# Patient Record
Sex: FEMALE | Race: BLACK OR AFRICAN AMERICAN | Employment: FULL TIME | URBAN - METROPOLITAN AREA
[De-identification: names, ages, dates, MRNs, and addresses within clinical notes are randomized per-mention and may not be internally consistent; named-entity substitution may affect disease eponyms.]

---

## 2018-10-18 ENCOUNTER — HOSPITAL ENCOUNTER (EMERGENCY)
Age: 33
Discharge: HOME | End: 2018-10-18
Attending: EMERGENCY MEDICINE
Payer: COMMERCIAL

## 2018-10-18 ENCOUNTER — APPOINTMENT (OUTPATIENT)
Dept: CT IMAGING | Age: 33
End: 2018-10-18
Payer: COMMERCIAL

## 2018-10-18 ENCOUNTER — APPOINTMENT (OUTPATIENT)
Dept: ULTRASOUND IMAGING | Age: 33
End: 2018-10-18
Payer: COMMERCIAL

## 2018-10-18 VITALS
DIASTOLIC BLOOD PRESSURE: 57 MMHG | RESPIRATION RATE: 15 BRPM | SYSTOLIC BLOOD PRESSURE: 111 MMHG | HEART RATE: 70 BPM | OXYGEN SATURATION: 99 % | WEIGHT: 130 LBS | BODY MASS INDEX: 22.31 KG/M2 | TEMPERATURE: 97.8 F

## 2018-10-18 DIAGNOSIS — N39.0 URINARY TRACT INFECTION WITHOUT HEMATURIA, SITE UNSPECIFIED: ICD-10-CM

## 2018-10-18 DIAGNOSIS — N83.201 RIGHT OVARIAN CYST: Primary | ICD-10-CM

## 2018-10-18 LAB
A/G RATIO: 1 (ref 1.1–2.2)
ALBUMIN SERPL-MCNC: 4.5 G/DL (ref 3.4–5)
ALP BLD-CCNC: 56 U/L (ref 40–129)
ALT SERPL-CCNC: 6 U/L (ref 10–40)
ANION GAP SERPL CALCULATED.3IONS-SCNC: 13 MMOL/L (ref 3–16)
AST SERPL-CCNC: 20 U/L (ref 15–37)
BACTERIA WET PREP: NORMAL
BACTERIA: ABNORMAL /HPF
BASOPHILS ABSOLUTE: 0 K/UL (ref 0–0.2)
BASOPHILS RELATIVE PERCENT: 0.2 %
BILIRUB SERPL-MCNC: 0.3 MG/DL (ref 0–1)
BILIRUBIN URINE: NEGATIVE
BLOOD, URINE: NEGATIVE
BUN BLDV-MCNC: 8 MG/DL (ref 7–20)
CALCIUM SERPL-MCNC: 9.1 MG/DL (ref 8.3–10.6)
CHLORIDE BLD-SCNC: 101 MMOL/L (ref 99–110)
CLARITY: ABNORMAL
CLUE CELLS: NORMAL
CO2: 22 MMOL/L (ref 21–32)
COLOR: YELLOW
CREAT SERPL-MCNC: 0.5 MG/DL (ref 0.6–1.1)
EOSINOPHILS ABSOLUTE: 0 K/UL (ref 0–0.6)
EOSINOPHILS RELATIVE PERCENT: 0.2 %
EPITHELIAL CELLS WET PREP: NORMAL
EPITHELIAL CELLS, UA: 5 /HPF (ref 0–5)
GFR AFRICAN AMERICAN: >60
GFR NON-AFRICAN AMERICAN: >60
GLOBULIN: 4.3 G/DL
GLUCOSE BLD-MCNC: 104 MG/DL (ref 70–99)
GLUCOSE URINE: NEGATIVE MG/DL
HCG QUALITATIVE: NEGATIVE
HCG(URINE) PREGNANCY TEST: NEGATIVE
HCT VFR BLD CALC: 43.8 % (ref 36–48)
HEMOGLOBIN: 14.4 G/DL (ref 12–16)
HYALINE CASTS: 5 /LPF (ref 0–8)
KETONES, URINE: NEGATIVE MG/DL
LACTIC ACID: 0.7 MMOL/L (ref 0.4–2)
LEUKOCYTE ESTERASE, URINE: ABNORMAL
LYMPHOCYTES ABSOLUTE: 1.4 K/UL (ref 1–5.1)
LYMPHOCYTES RELATIVE PERCENT: 7.1 %
MAGNESIUM: 2.2 MG/DL (ref 1.8–2.4)
MCH RBC QN AUTO: 30.1 PG (ref 26–34)
MCHC RBC AUTO-ENTMCNC: 32.9 G/DL (ref 31–36)
MCV RBC AUTO: 91.2 FL (ref 80–100)
MICROSCOPIC EXAMINATION: YES
MONOCYTES ABSOLUTE: 0.5 K/UL (ref 0–1.3)
MONOCYTES RELATIVE PERCENT: 2.5 %
NEUTROPHILS ABSOLUTE: 17.3 K/UL (ref 1.7–7.7)
NEUTROPHILS RELATIVE PERCENT: 90 %
NITRITE, URINE: NEGATIVE
PDW BLD-RTO: 12.5 % (ref 12.4–15.4)
PH UA: 5
PLATELET # BLD: 271 K/UL (ref 135–450)
PMV BLD AUTO: 8.3 FL (ref 5–10.5)
POTASSIUM REFLEX MAGNESIUM: 3.5 MMOL/L (ref 3.5–5.1)
PROTEIN UA: NEGATIVE MG/DL
RBC # BLD: 4.8 M/UL (ref 4–5.2)
RBC UA: 1 /HPF (ref 0–4)
RBC WET PREP: NORMAL
SODIUM BLD-SCNC: 136 MMOL/L (ref 136–145)
SOURCE WET PREP: NORMAL
SPECIFIC GRAVITY UA: 1.02
TOTAL PROTEIN: 8.8 G/DL (ref 6.4–8.2)
TRICHOMONAS PREP: NORMAL
URINE TYPE: ABNORMAL
UROBILINOGEN, URINE: 0.2 E.U./DL
WBC # BLD: 19.2 K/UL (ref 4–11)
WBC UA: 9 /HPF (ref 0–5)
WBC WET PREP: NORMAL
YEAST WET PREP: NORMAL

## 2018-10-18 PROCEDURE — 87210 SMEAR WET MOUNT SALINE/INK: CPT

## 2018-10-18 PROCEDURE — 76830 TRANSVAGINAL US NON-OB: CPT

## 2018-10-18 PROCEDURE — 2580000003 HC RX 258: Performed by: PHYSICIAN ASSISTANT

## 2018-10-18 PROCEDURE — 87491 CHLMYD TRACH DNA AMP PROBE: CPT

## 2018-10-18 PROCEDURE — 87591 N.GONORRHOEAE DNA AMP PROB: CPT

## 2018-10-18 PROCEDURE — 83605 ASSAY OF LACTIC ACID: CPT

## 2018-10-18 PROCEDURE — 80053 COMPREHEN METABOLIC PANEL: CPT

## 2018-10-18 PROCEDURE — 6360000002 HC RX W HCPCS: Performed by: PHYSICIAN ASSISTANT

## 2018-10-18 PROCEDURE — 99284 EMERGENCY DEPT VISIT MOD MDM: CPT

## 2018-10-18 PROCEDURE — 6360000004 HC RX CONTRAST MEDICATION: Performed by: PHYSICIAN ASSISTANT

## 2018-10-18 PROCEDURE — 6370000000 HC RX 637 (ALT 250 FOR IP): Performed by: PHYSICIAN ASSISTANT

## 2018-10-18 PROCEDURE — 84703 CHORIONIC GONADOTROPIN ASSAY: CPT

## 2018-10-18 PROCEDURE — 96375 TX/PRO/DX INJ NEW DRUG ADDON: CPT

## 2018-10-18 PROCEDURE — 85025 COMPLETE CBC W/AUTO DIFF WBC: CPT

## 2018-10-18 PROCEDURE — 74177 CT ABD & PELVIS W/CONTRAST: CPT

## 2018-10-18 PROCEDURE — 81001 URINALYSIS AUTO W/SCOPE: CPT

## 2018-10-18 PROCEDURE — 96374 THER/PROPH/DIAG INJ IV PUSH: CPT

## 2018-10-18 PROCEDURE — 96361 HYDRATE IV INFUSION ADD-ON: CPT

## 2018-10-18 PROCEDURE — 6370000000 HC RX 637 (ALT 250 FOR IP): Performed by: EMERGENCY MEDICINE

## 2018-10-18 PROCEDURE — 83735 ASSAY OF MAGNESIUM: CPT

## 2018-10-18 RX ORDER — HYDROCODONE BITARTRATE AND ACETAMINOPHEN 5; 325 MG/1; MG/1
1 TABLET ORAL ONCE
Status: COMPLETED | OUTPATIENT
Start: 2018-10-18 | End: 2018-10-18

## 2018-10-18 RX ORDER — SULFAMETHOXAZOLE AND TRIMETHOPRIM 800; 160 MG/1; MG/1
1 TABLET ORAL 2 TIMES DAILY
Qty: 6 TABLET | Refills: 0 | Status: SHIPPED | OUTPATIENT
Start: 2018-10-18 | End: 2018-10-21

## 2018-10-18 RX ORDER — KETOROLAC TROMETHAMINE 30 MG/ML
15 INJECTION, SOLUTION INTRAMUSCULAR; INTRAVENOUS ONCE
Status: COMPLETED | OUTPATIENT
Start: 2018-10-18 | End: 2018-10-18

## 2018-10-18 RX ORDER — 0.9 % SODIUM CHLORIDE 0.9 %
1000 INTRAVENOUS SOLUTION INTRAVENOUS ONCE
Status: COMPLETED | OUTPATIENT
Start: 2018-10-18 | End: 2018-10-18

## 2018-10-18 RX ORDER — SULFAMETHOXAZOLE AND TRIMETHOPRIM 800; 160 MG/1; MG/1
1 TABLET ORAL ONCE
Status: COMPLETED | OUTPATIENT
Start: 2018-10-18 | End: 2018-10-18

## 2018-10-18 RX ORDER — ONDANSETRON 2 MG/ML
4 INJECTION INTRAMUSCULAR; INTRAVENOUS ONCE
Status: COMPLETED | OUTPATIENT
Start: 2018-10-18 | End: 2018-10-18

## 2018-10-18 RX ORDER — HYDROCODONE BITARTRATE AND ACETAMINOPHEN 5; 325 MG/1; MG/1
1 TABLET ORAL EVERY 6 HOURS PRN
Qty: 10 TABLET | Refills: 0 | Status: SHIPPED | OUTPATIENT
Start: 2018-10-18 | End: 2018-10-21

## 2018-10-18 RX ORDER — NAPROXEN 500 MG/1
500 TABLET ORAL 2 TIMES DAILY
Qty: 20 TABLET | Refills: 0 | Status: SHIPPED | OUTPATIENT
Start: 2018-10-18 | End: 2021-08-12

## 2018-10-18 RX ORDER — ONDANSETRON 4 MG/1
4-8 TABLET, ORALLY DISINTEGRATING ORAL EVERY 12 HOURS PRN
Qty: 12 TABLET | Refills: 0 | Status: SHIPPED | OUTPATIENT
Start: 2018-10-18 | End: 2021-08-12

## 2018-10-18 RX ORDER — MORPHINE SULFATE 4 MG/ML
4 INJECTION, SOLUTION INTRAMUSCULAR; INTRAVENOUS ONCE
Status: COMPLETED | OUTPATIENT
Start: 2018-10-18 | End: 2018-10-18

## 2018-10-18 RX ADMIN — ONDANSETRON 4 MG: 2 INJECTION INTRAMUSCULAR; INTRAVENOUS at 10:15

## 2018-10-18 RX ADMIN — IOPAMIDOL 75 ML: 755 INJECTION, SOLUTION INTRAVENOUS at 11:12

## 2018-10-18 RX ADMIN — KETOROLAC TROMETHAMINE 15 MG: 30 INJECTION, SOLUTION INTRAMUSCULAR at 11:49

## 2018-10-18 RX ADMIN — HYDROCODONE BITARTRATE AND ACETAMINOPHEN 1 TABLET: 5; 325 TABLET ORAL at 15:18

## 2018-10-18 RX ADMIN — SULFAMETHOXAZOLE AND TRIMETHOPRIM 1 TABLET: 800; 160 TABLET ORAL at 13:45

## 2018-10-18 RX ADMIN — SODIUM CHLORIDE 1000 ML: 9 INJECTION, SOLUTION INTRAVENOUS at 11:06

## 2018-10-18 RX ADMIN — MORPHINE SULFATE 4 MG: 4 INJECTION INTRAVENOUS at 10:16

## 2018-10-18 ASSESSMENT — ENCOUNTER SYMPTOMS
CONSTIPATION: 0
COUGH: 0
VOMITING: 0
SHORTNESS OF BREATH: 0
ABDOMINAL PAIN: 1
BACK PAIN: 0
NAUSEA: 0
COLOR CHANGE: 0
DIARRHEA: 0

## 2018-10-18 ASSESSMENT — PAIN SCALES - GENERAL
PAINLEVEL_OUTOF10: 10

## 2018-10-18 ASSESSMENT — PAIN DESCRIPTION - LOCATION: LOCATION: ABDOMEN

## 2018-10-18 ASSESSMENT — PAIN DESCRIPTION - PAIN TYPE: TYPE: ACUTE PAIN

## 2018-10-18 NOTE — LETTER
[facility] Emergency Department  [address], [facility], [address]             October 21, 2018    Patient: Poornima Romero   YOB: 1985   Date of Visit: 10/18/2018       To Whom It May Concern:    oPornima Romero was seen and treated in our emergency department on 10/18/2018. She can return to work on 10/22/18.        Sincerely,         ***

## 2018-10-18 NOTE — LETTER
[facility] Emergency Department  [address] [place] [address], [address]             October 18, 2018    Patient: Yash Mittal   YOB: 1985   Date of Visit: 10/18/2018       To Whom It May Concern:    Yash Mittal was seen and treated in our emergency department on 10/18/2018. She may return to work on 10/20/18.       Sincerely,         Tonya Steinberg RN

## 2018-10-22 LAB
C. TRACHOMATIS DNA ,URINE: NEGATIVE
N. GONORRHOEAE DNA, URINE: NEGATIVE

## 2020-05-19 ENCOUNTER — HOSPITAL ENCOUNTER (EMERGENCY)
Age: 35
Discharge: HOME | End: 2020-05-19
Attending: EMERGENCY MEDICINE
Payer: COMMERCIAL

## 2020-05-19 VITALS
TEMPERATURE: 97.9 F | DIASTOLIC BLOOD PRESSURE: 65 MMHG | SYSTOLIC BLOOD PRESSURE: 115 MMHG | RESPIRATION RATE: 22 BRPM | HEART RATE: 76 BPM | OXYGEN SATURATION: 100 %

## 2020-05-19 PROCEDURE — 99284 EMERGENCY DEPT VISIT MOD MDM: CPT

## 2020-05-19 ASSESSMENT — ENCOUNTER SYMPTOMS
ABDOMINAL PAIN: 0
WHEEZING: 0
VOMITING: 0
CONSTIPATION: 0
BACK PAIN: 0
EYE PAIN: 0
FACIAL SWELLING: 0
DIARRHEA: 0
COUGH: 0
NAUSEA: 0
SORE THROAT: 0
SHORTNESS OF BREATH: 0

## 2020-05-19 NOTE — ED TRIAGE NOTES
Pt presents to ED with c/o bilateral ankle swelling. Pt states she noticed the swelling before bed tonight and is concerned for blood clot.

## 2020-05-19 NOTE — ED PROVIDER NOTES
Date ofevaluation: 2020    Chief Complaint   Joint Swelling      Nursing Notes, Past Medical Hx, Past Surgical Hx, Social Hx, Allergies, and Family Hx were reviewed. History of Present Illness     Day Pavon is a 29 y.o. female who presents with joint swelling. Patient reports that beginning today she noticed that her bilateral ankles were swollen. She denies any pain with this. Was not on her feet significantly today. Has never had a 7 before. The swelling did not extend past her ankles. She denies any fevers or chills. She does report that she has been eating more salty foods. She was mostly concerned that she had a blood clot after reading on the Internet. Review of Systems     Review of Systems   Constitutional: Negative for activity change, appetite change, chills and fever. HENT: Negative for congestion, facial swelling and sore throat. Eyes: Negative for pain. Respiratory: Negative for cough, shortness of breath and wheezing. Cardiovascular: Negative for chest pain. Gastrointestinal: Negative for abdominal pain, constipation, diarrhea, nausea and vomiting. Genitourinary: Negative for dysuria. Musculoskeletal: Positive for joint swelling. Negative for back pain and neck pain. Skin: Negative for rash. Neurological: Negative for dizziness and weakness. Past Medical, Surgical, Family, and Social History         Diagnosis Date    Seasonal allergies          Procedure Laterality Date    DILATION AND CURETTAGE OF UTERUS      INDUCED        Her family history is not on file. She reports that she has never smoked. She does not have any smokeless tobacco history on file. She reports current alcohol use. She reports that she does not use drugs.     Medications     Previous Medications    BUTALBITAL-APAP-CAFFEINE (FIORICET) -40 MG CAPS PER CAPSULE    Take 1 capsule by mouth every 4 hours as needed for Headaches    FAMOTIDINE (PEPCID) 20 MG TABLET Take 1 tablet by mouth daily for 10 doses    LORATADINE (CLARITIN) 10 MG TABLET    Take 10 mg by mouth daily. NAPROXEN (NAPROSYN) 500 MG TABLET    Take 1 tablet by mouth 2 times daily for 20 doses    ONDANSETRON (ZOFRAN ODT) 4 MG DISINTEGRATING TABLET    Take 1-2 tablets by mouth every 12 hours as needed for Nausea May Sub regular tablet (non-ODT) if insurance does not cover ODT. Allergies     She is allergic to other. Physical Exam     INITIAL VITALS: Pulse 76   Temp 97.9 °F (36.6 °C) (Oral)   Resp 22   LMP 04/28/2020 (Approximate)   SpO2 100%    Physical Exam  Vitals signs and nursing note reviewed. Constitutional:       General: She is not in acute distress. Appearance: She is well-developed. She is not diaphoretic. HENT:      Head: Normocephalic and atraumatic. Eyes:      Pupils: Pupils are equal, round, and reactive to light. Neck:      Musculoskeletal: Normal range of motion and neck supple. Cardiovascular:      Rate and Rhythm: Normal rate and regular rhythm. Pulses: Normal pulses. Heart sounds: No murmur. Pulmonary:      Effort: Pulmonary effort is normal. No respiratory distress. Breath sounds: No wheezing. Abdominal:      General: Abdomen is flat. There is no distension. Tenderness: There is no abdominal tenderness. Musculoskeletal: Normal range of motion. General: No tenderness. Comments: Minimal nonpitting edema to the left ankle. No edema to the right ankle. No edema to the shins bilaterally. Good range of motion in bilateral ankles. No tenderness to palpation. No erythema or warmth. Skin:     General: Skin is warm and dry. Findings: No rash. Neurological:      Mental Status: She is alert and oriented to person, place, and time. Motor: No abnormal muscle tone.       Coordination: Coordination normal.         Diagnostic Results       RECENTVITALS:   , Temp: 97.9 °F (36.6 °C), Pulse: 76, Resp: 22     Procedures none    ED Course     The patient was given the following medications:  No orders of the defined types were placed in this encounter. CONSULTS:  None    MEDICAL DECISION MAKING     Cooper Venegas is a 29 y.o. female presenting with joint swelling. Patient hemodynamically stable. In no respiratory distress. She has mild swelling to her right ankle but with no evidence of swelling extending to the cast.  I have very low suspicion for DVT. Low suspicion for septic joint. I suspect this is likely dependent edema and we discussed strategies for decreasing. I did instruct her to return if her symptoms became more concerning with signs of infection or blood clot. This patient was also evaluated by the attending physician. All care plans were discussed and agreedupon. Clinical Impression     1. Dependent edema        Disposition/Plan     PATIENT REFERRED TO:  No follow-up provider specified.     DISCHARGE MEDICATIONS:  New Prescriptions    No medications on file       DISPOSITION: Discharge home          Cathy Vo MD  Resident  05/19/20 3670

## 2021-04-15 ENCOUNTER — APPOINTMENT (OUTPATIENT)
Dept: CT IMAGING | Age: 36
End: 2021-04-15
Payer: COMMERCIAL

## 2021-04-15 ENCOUNTER — HOSPITAL ENCOUNTER (EMERGENCY)
Age: 36
Discharge: HOME | End: 2021-04-16
Attending: EMERGENCY MEDICINE
Payer: COMMERCIAL

## 2021-04-15 VITALS
OXYGEN SATURATION: 98 % | DIASTOLIC BLOOD PRESSURE: 75 MMHG | SYSTOLIC BLOOD PRESSURE: 122 MMHG | RESPIRATION RATE: 16 BRPM | TEMPERATURE: 98.1 F | HEART RATE: 103 BPM

## 2021-04-15 DIAGNOSIS — E86.0 DEHYDRATION: ICD-10-CM

## 2021-04-15 DIAGNOSIS — E16.2 HYPOGLYCEMIA: Primary | ICD-10-CM

## 2021-04-15 LAB
AMPHETAMINE SCREEN, URINE: NORMAL
BACTERIA: ABNORMAL /HPF
BARBITURATE SCREEN URINE: NORMAL
BASE EXCESS VENOUS: -6.1 MMOL/L (ref -3–3)
BASOPHILS ABSOLUTE: 0 K/UL (ref 0–0.2)
BASOPHILS RELATIVE PERCENT: 0.5 %
BENZODIAZEPINE SCREEN, URINE: NORMAL
BILIRUBIN URINE: ABNORMAL
BLOOD, URINE: ABNORMAL
CANNABINOID SCREEN URINE: NORMAL
CARBOXYHEMOGLOBIN: 2.1 % (ref 0–1.5)
CLARITY: CLEAR
COCAINE METABOLITE SCREEN URINE: NORMAL
COLOR: YELLOW
EOSINOPHILS ABSOLUTE: 0 K/UL (ref 0–0.6)
EOSINOPHILS RELATIVE PERCENT: 0.1 %
EPITHELIAL CELLS, UA: ABNORMAL /HPF (ref 0–5)
GLUCOSE BLD-MCNC: 97 MG/DL (ref 70–99)
GLUCOSE URINE: NEGATIVE MG/DL
HCO3 VENOUS: 18.6 MMOL/L (ref 23–29)
HCT VFR BLD CALC: 41.6 % (ref 36–48)
HEMOGLOBIN: 13.6 G/DL (ref 12–16)
HYALINE CASTS: ABNORMAL /LPF (ref 0–2)
KETONES, URINE: >=80 MG/DL
LEUKOCYTE ESTERASE, URINE: NEGATIVE
LYMPHOCYTES ABSOLUTE: 1.3 K/UL (ref 1–5.1)
LYMPHOCYTES RELATIVE PERCENT: 14.6 %
Lab: NORMAL
MCH RBC QN AUTO: 29.2 PG (ref 26–34)
MCHC RBC AUTO-ENTMCNC: 32.6 G/DL (ref 31–36)
MCV RBC AUTO: 89.5 FL (ref 80–100)
METHADONE SCREEN, URINE: NORMAL
METHEMOGLOBIN VENOUS: 0.5 %
MICROSCOPIC EXAMINATION: YES
MONOCYTES ABSOLUTE: 0.6 K/UL (ref 0–1.3)
MONOCYTES RELATIVE PERCENT: 6.3 %
MUCUS: ABNORMAL /LPF
NEUTROPHILS ABSOLUTE: 7 K/UL (ref 1.7–7.7)
NEUTROPHILS RELATIVE PERCENT: 78.5 %
NITRITE, URINE: NEGATIVE
O2 CONTENT, VEN: 19 VOL %
O2 SAT, VEN: 94 %
O2 THERAPY: ABNORMAL
OPIATE SCREEN URINE: NORMAL
OXYCODONE URINE: NORMAL
PCO2, VEN: 34.8 MMHG (ref 40–50)
PDW BLD-RTO: 13.2 % (ref 12.4–15.4)
PERFORMED ON: NORMAL
PH UA: 5.5
PH UA: 5.5 (ref 5–8)
PH VENOUS: 7.35 (ref 7.35–7.45)
PHENCYCLIDINE SCREEN URINE: NORMAL
PLATELET # BLD: 316 K/UL (ref 135–450)
PMV BLD AUTO: 7.5 FL (ref 5–10.5)
PO2, VEN: 68.8 MMHG (ref 25–40)
PROPOXYPHENE SCREEN: NORMAL
PROTEIN UA: 30 MG/DL
RBC # BLD: 4.65 M/UL (ref 4–5.2)
RBC UA: ABNORMAL /HPF (ref 0–4)
SPECIFIC GRAVITY UA: >=1.03 (ref 1–1.03)
TCO2 CALC VENOUS: 20 MMOL/L
URINE REFLEX TO CULTURE: ABNORMAL
URINE TYPE: ABNORMAL
UROBILINOGEN, URINE: 0.2 E.U./DL
WBC # BLD: 8.9 K/UL (ref 4–11)
WBC UA: ABNORMAL /HPF (ref 0–5)

## 2021-04-15 PROCEDURE — 99283 EMERGENCY DEPT VISIT LOW MDM: CPT

## 2021-04-15 PROCEDURE — 82803 BLOOD GASES ANY COMBINATION: CPT

## 2021-04-15 PROCEDURE — 80307 DRUG TEST PRSMV CHEM ANLYZR: CPT

## 2021-04-15 PROCEDURE — 70450 CT HEAD/BRAIN W/O DYE: CPT

## 2021-04-15 PROCEDURE — 96360 HYDRATION IV INFUSION INIT: CPT

## 2021-04-15 PROCEDURE — 81001 URINALYSIS AUTO W/SCOPE: CPT

## 2021-04-15 PROCEDURE — 82140 ASSAY OF AMMONIA: CPT

## 2021-04-15 PROCEDURE — 93005 ELECTROCARDIOGRAM TRACING: CPT | Performed by: EMERGENCY MEDICINE

## 2021-04-15 PROCEDURE — 85025 COMPLETE CBC W/AUTO DIFF WBC: CPT

## 2021-04-15 PROCEDURE — 82077 ASSAY SPEC XCP UR&BREATH IA: CPT

## 2021-04-15 PROCEDURE — 80053 COMPREHEN METABOLIC PANEL: CPT

## 2021-04-16 LAB
A/G RATIO: 1.4 (ref 1.1–2.2)
ALBUMIN SERPL-MCNC: 4.4 G/DL (ref 3.4–5)
ALP BLD-CCNC: 52 U/L (ref 40–129)
ALT SERPL-CCNC: <5 U/L (ref 10–40)
AMMONIA: 29 UMOL/L (ref 11–51)
ANION GAP SERPL CALCULATED.3IONS-SCNC: 14 MMOL/L (ref 3–16)
AST SERPL-CCNC: 15 U/L (ref 15–37)
BILIRUB SERPL-MCNC: 0.5 MG/DL (ref 0–1)
BUN BLDV-MCNC: 17 MG/DL (ref 7–20)
CALCIUM SERPL-MCNC: 9.3 MG/DL (ref 8.3–10.6)
CHLORIDE BLD-SCNC: 103 MMOL/L (ref 99–110)
CO2: 18 MMOL/L (ref 21–32)
CREAT SERPL-MCNC: 0.8 MG/DL (ref 0.6–1.1)
ETHANOL: NORMAL MG/DL (ref 0–0.08)
GFR AFRICAN AMERICAN: >60
GFR NON-AFRICAN AMERICAN: >60
GLOBULIN: 3.2 G/DL
GLUCOSE BLD-MCNC: 178 MG/DL (ref 70–99)
GLUCOSE BLD-MCNC: 79 MG/DL (ref 70–99)
PERFORMED ON: NORMAL
POTASSIUM REFLEX MAGNESIUM: 3.9 MMOL/L (ref 3.5–5.1)
SODIUM BLD-SCNC: 135 MMOL/L (ref 136–145)
SPECIMEN STATUS: NORMAL
TOTAL PROTEIN: 7.6 G/DL (ref 6.4–8.2)

## 2021-04-16 PROCEDURE — 2580000003 HC RX 258: Performed by: EMERGENCY MEDICINE

## 2021-04-16 RX ORDER — AMMONIA INHALANTS 0.04 G/.3ML
INHALANT RESPIRATORY (INHALATION)
Status: DISCONTINUED
Start: 2021-04-16 | End: 2021-04-16 | Stop reason: HOSPADM

## 2021-04-16 RX ORDER — 0.9 % SODIUM CHLORIDE 0.9 %
1000 INTRAVENOUS SOLUTION INTRAVENOUS ONCE
Status: COMPLETED | OUTPATIENT
Start: 2021-04-16 | End: 2021-04-16

## 2021-04-16 RX ADMIN — SODIUM CHLORIDE 1000 ML: 9 INJECTION, SOLUTION INTRAVENOUS at 01:03

## 2021-04-16 NOTE — ED NOTES
Patient refusing to turn down extremity loud video on phone. RN explained to patient that she would return when patient agreed to turn off phone so that they could discuss DC instructions.       Alla Linda RN  04/16/21 5768

## 2021-04-16 NOTE — ED NOTES
Patient agreeable to turning off phone so that DC instructions could be discussed. Patient states she has no questions or concerns at this time. Patient on phone with grandmother now to get ride home.       Gloria Smalls RN  04/16/21 1138

## 2021-04-16 NOTE — ED NOTES
RN to room because patient is yelling out \"God in holy\". Patient refused to speak with RN. Patient is only requesting items from staff but is not answering questions.       Jimy Murphy RN  04/15/21 3440

## 2021-04-16 NOTE — ED NOTES
Patient refusing to talk to staff. Patient is only speaking with staff when she wants to request something. RN explained to patient that we could not help her if she keeps ignoring us. Patient asked RN to walk to the bathroom, RN explained that would not be safe due to her not responding. Patient then stated that she will respond and that she did not before because she was \"talking with the lord and praying\".      Perico Marino RN  04/16/21 5140

## 2021-04-16 NOTE — ED PROVIDER NOTES
[facility]  ED  EMERGENCY DEPARTMENT ENCOUNTER      Pt Name: Radha Coffey  MRN: [id number]  [place] 1985  Date of evaluation: 4/15/2021  Provider: Malorie Nuñez MD    CHIEF COMPLAINT       Chief Complaint   Patient presents with    Altered Mental Status     found sitting in car in the middle of the road. BS was 51 glucose given now 91. Patient has had psych issue since mother passed away a month ago         HISTORY OF PRESENT ILLNESS   (Location/Symptom, Timing/Onset, Context/Setting, Quality, Duration, Modifying Factors, Severity)  Note limiting factors. Radha Coffey is a 28 y.o. female with past medical history of depression here today for altered mental status    Patient was reportedly found sitting in her car in the middle of the road confused and not responding to passersby. EMS was called and found that her blood sugar was in the 50s and gave her oral glucose. Repeat check showed her sugar to be 91 but she was brought to the hospital for further work-up. Any initial further attempts at history were unsuccessful as the patient was not responding to questioning. No further meaningful information was able to be obtained    HPI    Nursing Notes were reviewed. REVIEW OF SYSTEMS    (2-9 systems for level 4, 10 or more for level 5)     Review of Systems    Please see HPI for pertinent positive and negative review of system findings. A full 10 system ROS was performed and otherwise negative.         PAST MEDICAL HISTORY     Past Medical History:   Diagnosis Date    Seasonal allergies          SURGICAL HISTORY       Past Surgical History:   Procedure Laterality Date    DILATION AND CURETTAGE OF UTERUS      INDUCED            CURRENT MEDICATIONS       Discharge Medication List as of 2021  1:33 AM      CONTINUE these medications which have NOT CHANGED    Details   naproxen (NAPROSYN) 500 MG tablet Take 1 tablet by mouth 2 times daily for 20 doses, Disp-20 tablet, R-0Print      ondansetron (ZOFRAN ODT) 4 MG disintegrating tablet Take 1-2 tablets by mouth every 12 hours as needed for Nausea May Sub regular tablet (non-ODT) if insurance does not cover ODT., Disp-12 tablet, R-0Print      butalbital-APAP-caffeine (FIORICET) -40 MG CAPS per capsule Take 1 capsule by mouth every 4 hours as needed for Headaches, Disp-18 capsule, R-0Print      famotidine (PEPCID) 20 MG tablet Take 1 tablet by mouth daily for 10 doses, Disp-10 tablet, R-0Print      loratadine (CLARITIN) 10 MG tablet Take 10 mg by mouth daily. ALLERGIES     Other    FAMILY HISTORY     No family history on file.        SOCIAL HISTORY       Social History     Socioeconomic History    Marital status: Single     Spouse name: Not on file    Number of children: Not on file    Years of education: Not on file    Highest education level: Not on file   Occupational History    Not on file   Social Needs    Financial resource strain: Not on file    Food insecurity     Worry: Not on file     Inability: Not on file    Transportation needs     Medical: Not on file     Non-medical: Not on file   Tobacco Use    Smoking status: Never Smoker   Substance and Sexual Activity    Alcohol use: Yes     Comment: occ    Drug use: No    Sexual activity: Not on file   Lifestyle    Physical activity     Days per week: Not on file     Minutes per session: Not on file    Stress: Not on file   Relationships    Social connections     Talks on phone: Not on file     Gets together: Not on file     Attends Pent[employer]ism service: Not on file     Active member of club or organization: Not on file     Attends meetings of clubs or organizations: Not on file     Relationship status: Not on file    Intimate partner violence     Fear of current or ex partner: Not on file     Emotionally abused: Not on file     Physically abused: Not on file     Forced sexual activity: Not on file   Other Topics Concern    Not on file   Social History Narrative    Not on file       SCREENINGS               PHYSICAL EXAM    (up to 7 for level 4, 8 or more for level 5)     ED Triage Vitals [04/15/21 2155]   BP Temp Temp Source Pulse Resp SpO2 Height Weight   122/75 98.1 °F (36.7 °C) Oral 103 16 98 % -- --       Physical Exam    General appearance: Patient lying in bed initially calmly with her eyes closed. Skin:  Warm. Dry. Eye:  Extraocular movements intact. Pupils are equally round and reactive to light and accommodation. Extraocular motions are intact. CN II-XII intact. Ears, nose, mouth and throat:  Oral mucosa slightly dry  Neck:  Trachea midline. Heart: Slightly tachycardic but regular  Perfusion:  intact  Respiratory:  Lungs clear to auscultation bilaterally. Respirations nonlabored. Abdominal:   Non distended. Nontender  Neurological: Patient lying in bed with her eyes closed but will awake regard and intermittently follow commands. Patient will intermittently say [employer]ism scripture to her self. Will intermittently answer questions and act appropriately only then to close her eyes and become unresponsive. No tonic-clonic activity activity. No seizure-like activity. Moves all extremities spontaneously. Intact sensation throughout. Intact finger-to-nose bilaterally. No drift in the upper or lower extremities. Gait normal.  2+ bilateral patellar reflexes  Musculoskeletal:   Normal ROM, no deformities          Psychiatric: Very tearful. Repeatedly recites [employer]ism scripture. Standoffish.       DIAGNOSTIC RESULTS       Labs Reviewed   COMPREHENSIVE METABOLIC PANEL W/ REFLEX TO MG FOR LOW K - Abnormal; Notable for the following components:       Result Value    Sodium 135 (*)     CO2 18 (*)     Glucose 178 (*)     ALT <5 (*)     All other components within normal limits    Narrative:     Performed at:  [facility] EMERGENCY [address]   Phone [phone]   URINE RT REFLEX TO CULTURE - Abnormal; Notable for the following components:    Bilirubin Urine SMALL (*)     Ketones, Urine >=80 (*)     Blood, Urine TRACE-INTACT (*)     Protein, UA 30 (*)     All other components within normal limits    Narrative:     Performed at:  [address], [facility] [employer]   Phone [phone]   BLOOD GAS, VENOUS - Abnormal; Notable for the following components:    pH, Dominick 7.346 (*)     pCO2, Dominick 34.8 (*)     pO2, Dominick 68.8 (*)     HCO3, Venous 18.6 (*)     Base Excess, Dominick -6.1 (*)     Carboxyhemoglobin 2.1 (*)     All other components within normal limits    Narrative:     Performed at:  [place] [address] [employer]   Phone [phone]   MICROSCOPIC URINALYSIS - Abnormal; Notable for the following components:    Hyaline Casts, UA 3-5 (*)     Mucus, UA 2+ (*)     Epithelial Cells, UA 6-10 (*)     Bacteria, UA Rare (*)     All other components within normal limits    Narrative:     Performed at:  [place] [address] [employer]   Phone [phone]   CBC WITH AUTO DIFFERENTIAL    Narrative:     Performed at:  [place] [address] [employer]   Phone [phone]   ETHANOL    Narrative:     Performed at:  [place] [address] [employer]   Phone [phone]   URINE DRUG SCREEN    Narrative:     Performed at:  [place] [address] [employer]   Phone [phone]   AMMONIA    Narrative:     Performed at:  [place] [address] [employer]   Phone [phone]   SAMPLE POSSIBLE BLOOD BANK TESTING    Narrative:     Performed at:  [place] [address] [employer]   Phone [phone]   POCT GLUCOSE    Narrative:     Performed at:  [facility]) [facility]  [address] [facility] [address],  [address], [address]   Phone [phone]   POCT GLUCOSE    Narrative:     Performed at:  [facility]  [address] [facility] [address],  [address], [address]   Phone [phone]       Interpretation per the Radiologist below, if obtained/available at the time of this note:    CT Head WO Contrast   Final Result   No acute intracranial abnormality. All other labs/imaging were within normal range or not returned as of this dictation. EMERGENCY DEPARTMENT COURSE and DIFFERENTIAL DIAGNOSIS/MDM:   Vitals:    Vitals:    04/15/21 2155   BP: 122/75   Pulse: 103   Resp: 16   Temp: 98.1 °F (36.7 °C)   TempSrc: Oral   SpO2: 98%       EKG: Sinus rhythm with sinus arrhythmia occasional PVCs rate of 93 bpm.  No ST elevation. Patient presents emergency department today for reported altered mental status. Reportedly mildly hypoglycemic for EMS with a blood sugar in the mid 50s. Blood sugar has remained stable here. Patient is reciting scripture to herself and only intermittently following commands. Appears consistent with a psychiatric etiology to her confusion but a work-up was performed given her inability to answer any further questions. This has shown some evidence of dehydration but was otherwise unremarkable. Patient was observed here for well over 3 hours and is now awake alert oriented. She is hesitant to say why she has been so tearful and crying but is now at least answering questions. Denies suicidal or homicidal ideations. Does not appear to be a danger to herself or others. She states she would prefer not to talk about what is going on. States she has not any harm at this time. Denies hallucinations will be discharged home of her own accord. EMS report that family had stated the patient has had a hard time dealing with the recent loss of her mother.   Patient stated she would not like to talk about this at present    MDM    CONSULTS     None    Critical Care:   None    REASSESSMENT          PROCEDURE     Unless otherwise noted below, none     Procedures      FINAL IMPRESSION      1. Hypoglycemia    2. Dehydration            DISPOSITION/PLAN   DISPOSITION Decision To Discharge 04/16/2021 01:32:16 AM        PATIENT REFERRED TO:  [facility]  ED  [address] [facility] [address]  [place] [address]  [phone]    As needed      DISCHARGE MEDICATIONS:  Discharge Medication List as of 4/16/2021  1:33 AM        Controlled Substances Monitoring:     No flowsheet data found.     (Please note that portions of this note were completed with a voice recognition program.  Efforts were made to edit the dictations but occasionally words are mis-transcribed.)    Nicolas Mendez MD (electronically signed)  Attending Emergency Physician            Qian Murphy MD  04/16/21 2264

## 2021-04-18 LAB
EKG ATRIAL RATE: 93 BPM
EKG DIAGNOSIS: NORMAL
EKG P AXIS: 89 DEGREES
EKG P-R INTERVAL: 140 MS
EKG Q-T INTERVAL: 386 MS
EKG QRS DURATION: 88 MS
EKG QTC CALCULATION (BAZETT): 479 MS
EKG R AXIS: 74 DEGREES
EKG T AXIS: 74 DEGREES
EKG VENTRICULAR RATE: 93 BPM

## 2021-04-18 PROCEDURE — 93010 ELECTROCARDIOGRAM REPORT: CPT | Performed by: INTERNAL MEDICINE

## 2021-08-11 PROCEDURE — 99283 EMERGENCY DEPT VISIT LOW MDM: CPT

## 2021-08-12 ENCOUNTER — HOSPITAL ENCOUNTER (EMERGENCY)
Age: 36
Discharge: HOME | End: 2021-08-12
Attending: EMERGENCY MEDICINE
Payer: COMMERCIAL

## 2021-08-12 ENCOUNTER — APPOINTMENT (OUTPATIENT)
Dept: GENERAL RADIOLOGY | Age: 36
End: 2021-08-12
Payer: COMMERCIAL

## 2021-08-12 VITALS
SYSTOLIC BLOOD PRESSURE: 109 MMHG | HEIGHT: 64 IN | BODY MASS INDEX: 20.83 KG/M2 | RESPIRATION RATE: 18 BRPM | DIASTOLIC BLOOD PRESSURE: 65 MMHG | HEART RATE: 64 BPM | OXYGEN SATURATION: 100 % | TEMPERATURE: 98.8 F | WEIGHT: 122 LBS

## 2021-08-12 DIAGNOSIS — M54.12 CERVICAL RADICULOPATHY: Primary | ICD-10-CM

## 2021-08-12 LAB
EKG ATRIAL RATE: 63 BPM
EKG DIAGNOSIS: NORMAL
EKG P AXIS: 67 DEGREES
EKG P-R INTERVAL: 164 MS
EKG Q-T INTERVAL: 382 MS
EKG QRS DURATION: 92 MS
EKG QTC CALCULATION (BAZETT): 390 MS
EKG R AXIS: 47 DEGREES
EKG T AXIS: 48 DEGREES
EKG VENTRICULAR RATE: 63 BPM

## 2021-08-12 PROCEDURE — 71046 X-RAY EXAM CHEST 2 VIEWS: CPT

## 2021-08-12 PROCEDURE — 93005 ELECTROCARDIOGRAM TRACING: CPT | Performed by: EMERGENCY MEDICINE

## 2021-08-12 RX ORDER — NAPROXEN 500 MG/1
500 TABLET ORAL 2 TIMES DAILY PRN
Qty: 20 TABLET | Refills: 0 | Status: SHIPPED | OUTPATIENT
Start: 2021-08-12

## 2021-08-12 RX ORDER — CYCLOBENZAPRINE HCL 10 MG
10 TABLET ORAL 3 TIMES DAILY PRN
Qty: 15 TABLET | Refills: 0 | Status: SHIPPED | OUTPATIENT
Start: 2021-08-12 | End: 2022-09-18 | Stop reason: SDUPTHER

## 2021-08-12 ASSESSMENT — ENCOUNTER SYMPTOMS
RESPIRATORY NEGATIVE: 1
EYES NEGATIVE: 1
GASTROINTESTINAL NEGATIVE: 1

## 2021-08-12 NOTE — ED NOTES
MARIA GUADALUPE paperwork and results explained to patient. Questions addressed and explained to patient's satisfaction.  Patient denies further needs and verbalized understanding of need for FU       Timi Sesay RN  08/12/21 7596

## 2021-08-12 NOTE — ED PROVIDER NOTES
[facility]          ATTENDING PHYSICIAN NOTE       Date of evaluation: 2021    Chief Complaint     Numbness (Left arm; intermittent periods of numbness for about one minute per episode from 9PM to 11PM)      History of Present Illness     Shireen Contreras is a 28 y.o. female who presents to the emergency department complaining of left shoulder pain, left arm numbness, and chest pain. Patient states that her symptoms started approximately 2 and half hours prior to presentation while she was at [employer]. She noted a sharp pain in the anterior aspect of her left shoulder. She states soon after that she started helping a sensation of numbness and tingling in her left hand. She subsequently developed chest pain that she describes as a pressure in the left side of her chest.  She states the symptoms have been going on intermittently, lasting approximately a minute at a time so decided come to the emergency department for evaluation. Patient describes having chest pain before but denies ever having the arm numbness. Patient denies any recent trauma. She denies fevers or chills. Review of Systems     Review of Systems   Constitutional: Negative. HENT: Negative. Eyes: Negative. Respiratory: Negative. Cardiovascular: Positive for chest pain. Gastrointestinal: Negative. Genitourinary: Negative. Musculoskeletal: Positive for arthralgias. Neurological: Positive for numbness. Negative for weakness and headaches. All other systems reviewed and are negative. Past Medical, Surgical, Family, and Social History     She has a past medical history of Schizophrenia ([address] and Seasonal allergies. She has a past surgical history that includes Dilation and curettage of uterus and Induced . Her family history is not on file. She reports that she has never smoked. She has never used smokeless tobacco. She reports current alcohol use.  She reports that she does not use drugs. Medications     Discharge Medication List as of 8/12/2021  1:59 AM          Allergies     She is allergic to methotrexate and other. Physical Exam     INITIAL VITALS: BP: 109/65, Temp: 98.8 °F (37.1 °C), Pulse: 64, Resp: 18, SpO2: 100 %   Physical Exam  Vitals and nursing note reviewed. Constitutional:       General: She is not in acute distress. HENT:      Head: Normocephalic and atraumatic. Mouth/Throat:      Mouth: Mucous membranes are moist.      Pharynx: No oropharyngeal exudate. Eyes:      General: No scleral icterus. Extraocular Movements: Extraocular movements intact. Conjunctiva/sclera: Conjunctivae normal.      Pupils: Pupils are equal, round, and reactive to light. Neck:      Comments: Tender to palpation along the left paraspinal musculature. Patient does have a positive Spurling's test.  Cardiovascular:      Rate and Rhythm: Normal rate and regular rhythm. Pulses: Normal pulses. Heart sounds: Normal heart sounds. Pulmonary:      Effort: Pulmonary effort is normal.      Breath sounds: Normal breath sounds. No wheezing, rhonchi or rales. Musculoskeletal:      Left shoulder: Tenderness present. Normal range of motion. Cervical back: Normal range of motion and neck supple. Muscular tenderness present. No spinous process tenderness. Comments: Tender to palpation along the anterior aspect of the left glenohumeral joint. There are no obvious bony deformities noted. Range of motion is normal.   Skin:     General: Skin is warm and dry. Neurological:      General: No focal deficit present. Mental Status: She is alert and oriented to person, place, and time. Cranial Nerves: No cranial nerve deficit. Motor: No weakness.       Coordination: Coordination normal.         Diagnostic Results     EKG   EKG as interpreted by me shows the patient to be in a normal sinus rhythm with a normal axis, normal [place] and QT intervals, normal QRS duration, no ST segment abnormalities, no T wave abnormalities. RADIOLOGY:  XR CHEST (2 VW)   Final Result     No acute cardiopulmonary process. RECENT VITALS:  BP: 109/65,Temp: 98.8 °F (37.1 °C), Pulse: 64, Resp: 18, SpO2: 100 %     Procedures     N/A    ED Course     Nursing Notes, Past Medical Hx, Past Surgical Hx, Social Hx,Allergies, and Family Hx were reviewed. patient was given the following medications:  Orders Placed This Encounter   Medications    naproxen (NAPROSYN) 500 MG tablet     Sig: Take 1 tablet by mouth 2 times daily as needed for Pain     Dispense:  20 tablet     Refill:  0    cyclobenzaprine (FLEXERIL) 10 MG tablet     Sig: Take 1 tablet by mouth 3 times daily as needed for Muscle spasms     Dispense:  15 tablet     Refill:  0       CONSULTS:  None    MEDICAL DECISIONMAKING / ASSESSMENT / Yash Shital is a 28 y.o. female presents complaining of left shoulder pain, left arm numbness, and chest pain. Patient symptoms are most consistent with a cervical radiculopathy based on her history and physical exam.  EKG and chest x-ray are unremarkable. I have low suspicion for acute coronary syndrome and do not feel additional cardiac evaluation is necessary. Patient will be treated with anti-inflammatories and muscle relaxants. She will be instructed to follow-up with her primary care provider for repeat evaluation and referral to physical therapy if symptoms continue. Clinical Impression     1.  Cervical radiculopathy        Disposition     PATIENT REFERRED TO:  *Valeria Dominguez [facility] [address] [facility]  [phone]            DISCHARGE MEDICATIONS:  Discharge Medication List as of 8/12/2021  1:59 AM      START taking these medications    Details   cyclobenzaprine (FLEXERIL) 10 MG tablet Take 1 tablet by mouth 3 times daily as needed for Muscle spasms, Disp-15 tablet, R-0Print             DISPOSITION Decision To Discharge 08/12/2021 01:55:17 AM        Bassam Castro MD  08/12/21 9325

## 2021-08-12 NOTE — LETTER
[facility] Emergency Department  [facility] [address]  Phone: [phone]  Fax: [phone]               August 12, 2021    Patient: Shireen Thompson   YOB: 1985   Date of Visit: 8/11/2021       To Whom It May Concern:    Shireen Thompson was seen and treated in our emergency department on 8/11/2021. She may return to work on 8/13/21.       Sincerely,       Lyubov Flores MD         Signature:__________________________________

## 2021-12-28 ENCOUNTER — HOSPITAL ENCOUNTER (EMERGENCY)
Age: 36
Discharge: HOME | End: 2021-12-28
Attending: EMERGENCY MEDICINE
Payer: COMMERCIAL

## 2021-12-28 ENCOUNTER — APPOINTMENT (OUTPATIENT)
Dept: GENERAL RADIOLOGY | Age: 36
End: 2021-12-28
Payer: COMMERCIAL

## 2021-12-28 VITALS
HEIGHT: 64 IN | HEART RATE: 70 BPM | BODY MASS INDEX: 20.94 KG/M2 | OXYGEN SATURATION: 98 % | SYSTOLIC BLOOD PRESSURE: 120 MMHG | TEMPERATURE: 98.2 F | RESPIRATION RATE: 18 BRPM | DIASTOLIC BLOOD PRESSURE: 89 MMHG

## 2021-12-28 DIAGNOSIS — N30.00 ACUTE CYSTITIS WITHOUT HEMATURIA: ICD-10-CM

## 2021-12-28 DIAGNOSIS — U07.1 COVID: Primary | ICD-10-CM

## 2021-12-28 LAB
A/G RATIO: 1.1 (ref 1.1–2.2)
ALBUMIN SERPL-MCNC: 3.9 G/DL (ref 3.4–5)
ALP BLD-CCNC: 54 U/L (ref 40–129)
ALT SERPL-CCNC: 8 U/L (ref 10–40)
ANION GAP SERPL CALCULATED.3IONS-SCNC: 9 MMOL/L (ref 3–16)
AST SERPL-CCNC: 24 U/L (ref 15–37)
BACTERIA WET PREP: NORMAL
BACTERIA: ABNORMAL /HPF
BASOPHILS ABSOLUTE: 0 K/UL (ref 0–0.2)
BASOPHILS RELATIVE PERCENT: 0.4 %
BILIRUB SERPL-MCNC: <0.2 MG/DL (ref 0–1)
BILIRUBIN URINE: NEGATIVE
BLOOD, URINE: NEGATIVE
BUN BLDV-MCNC: 7 MG/DL (ref 7–20)
CALCIUM SERPL-MCNC: 8.3 MG/DL (ref 8.3–10.6)
CHLORIDE BLD-SCNC: 101 MMOL/L (ref 99–110)
CLARITY: CLEAR
CLUE CELLS: NORMAL
CO2: 24 MMOL/L (ref 21–32)
COLOR: YELLOW
CREAT SERPL-MCNC: 0.8 MG/DL (ref 0.6–1.1)
EKG ATRIAL RATE: 71 BPM
EKG DIAGNOSIS: NORMAL
EKG P AXIS: 67 DEGREES
EKG P-R INTERVAL: 146 MS
EKG Q-T INTERVAL: 390 MS
EKG QRS DURATION: 82 MS
EKG QTC CALCULATION (BAZETT): 423 MS
EKG R AXIS: 56 DEGREES
EKG T AXIS: 59 DEGREES
EKG VENTRICULAR RATE: 71 BPM
EOSINOPHILS ABSOLUTE: 0 K/UL (ref 0–0.6)
EOSINOPHILS RELATIVE PERCENT: 0.7 %
EPITHELIAL CELLS WET PREP: NORMAL
EPITHELIAL CELLS, UA: ABNORMAL /HPF (ref 0–5)
GFR AFRICAN AMERICAN: >60
GFR NON-AFRICAN AMERICAN: >60
GLUCOSE BLD-MCNC: 103 MG/DL (ref 70–99)
GLUCOSE URINE: NEGATIVE MG/DL
HCG QUALITATIVE: NEGATIVE
HCT VFR BLD CALC: 38.4 % (ref 36–48)
HEMOGLOBIN: 12.9 G/DL (ref 12–16)
KETONES, URINE: NEGATIVE MG/DL
LEUKOCYTE ESTERASE, URINE: ABNORMAL
LIPASE: 35 U/L (ref 13–60)
LYMPHOCYTES ABSOLUTE: 1.5 K/UL (ref 1–5.1)
LYMPHOCYTES RELATIVE PERCENT: 38.1 %
MCH RBC QN AUTO: 29.4 PG (ref 26–34)
MCHC RBC AUTO-ENTMCNC: 33.6 G/DL (ref 31–36)
MCV RBC AUTO: 87.3 FL (ref 80–100)
MICROSCOPIC EXAMINATION: YES
MONOCYTES ABSOLUTE: 0.4 K/UL (ref 0–1.3)
MONOCYTES RELATIVE PERCENT: 8.7 %
MUCUS: ABNORMAL /LPF
NEUTROPHILS ABSOLUTE: 2.1 K/UL (ref 1.7–7.7)
NEUTROPHILS RELATIVE PERCENT: 52.1 %
NITRITE, URINE: POSITIVE
PDW BLD-RTO: 12.9 % (ref 12.4–15.4)
PH UA: 6 (ref 5–8)
PLATELET # BLD: 265 K/UL (ref 135–450)
PMV BLD AUTO: 8.2 FL (ref 5–10.5)
POTASSIUM SERPL-SCNC: 4.3 MMOL/L (ref 3.5–5.1)
PROTEIN UA: NEGATIVE MG/DL
RBC # BLD: 4.4 M/UL (ref 4–5.2)
RBC UA: ABNORMAL /HPF (ref 0–4)
RBC WET PREP: NORMAL
SODIUM BLD-SCNC: 134 MMOL/L (ref 136–145)
SOURCE WET PREP: NORMAL
SPECIFIC GRAVITY UA: 1.02 (ref 1–1.03)
TOTAL PROTEIN: 7.6 G/DL (ref 6.4–8.2)
TRICHOMONAS PREP: NORMAL
TROPONIN: <0.01 NG/ML
URINE REFLEX TO CULTURE: YES
URINE TYPE: ABNORMAL
UROBILINOGEN, URINE: 0.2 E.U./DL
WBC # BLD: 4.1 K/UL (ref 4–11)
WBC UA: ABNORMAL /HPF (ref 0–5)
WBC WET PREP: NORMAL
YEAST WET PREP: NORMAL

## 2021-12-28 PROCEDURE — 80053 COMPREHEN METABOLIC PANEL: CPT

## 2021-12-28 PROCEDURE — 71046 X-RAY EXAM CHEST 2 VIEWS: CPT

## 2021-12-28 PROCEDURE — 83690 ASSAY OF LIPASE: CPT

## 2021-12-28 PROCEDURE — 84484 ASSAY OF TROPONIN QUANT: CPT

## 2021-12-28 PROCEDURE — 81001 URINALYSIS AUTO W/SCOPE: CPT

## 2021-12-28 PROCEDURE — 85025 COMPLETE CBC W/AUTO DIFF WBC: CPT

## 2021-12-28 PROCEDURE — 84703 CHORIONIC GONADOTROPIN ASSAY: CPT

## 2021-12-28 PROCEDURE — 99283 EMERGENCY DEPT VISIT LOW MDM: CPT

## 2021-12-28 PROCEDURE — 93005 ELECTROCARDIOGRAM TRACING: CPT | Performed by: EMERGENCY MEDICINE

## 2021-12-28 PROCEDURE — 87086 URINE CULTURE/COLONY COUNT: CPT

## 2021-12-28 PROCEDURE — 87210 SMEAR WET MOUNT SALINE/INK: CPT

## 2021-12-28 RX ORDER — CEPHALEXIN 500 MG/1
500 CAPSULE ORAL 2 TIMES DAILY
Qty: 14 CAPSULE | Refills: 0 | Status: SHIPPED | OUTPATIENT
Start: 2021-12-28 | End: 2022-01-04

## 2021-12-28 ASSESSMENT — PAIN DESCRIPTION - DESCRIPTORS: DESCRIPTORS: SHARP

## 2021-12-28 ASSESSMENT — PAIN SCALES - GENERAL: PAINLEVEL_OUTOF10: 4

## 2021-12-28 ASSESSMENT — PAIN DESCRIPTION - PAIN TYPE: TYPE: ACUTE PAIN

## 2021-12-28 ASSESSMENT — PAIN DESCRIPTION - LOCATION: LOCATION: CHEST

## 2021-12-28 ASSESSMENT — PAIN DESCRIPTION - FREQUENCY: FREQUENCY: CONTINUOUS

## 2021-12-29 NOTE — ED PROVIDER NOTES
ED Attending Attestation Note     Date of evaluation: 12/28/2021    This patient was seen by the advance practice provider. I have seen and examined the patient, agree with the workup, evaluation, management and diagnosis. The care plan has been discussed. I have reviewed the ECG and concur with the LINDA's interpretation. My assessment reveals well-appearing female in no acute distress presenting today with intermittent chest pain and positive for COVID-19 she is well-appearing in no acute distress rules out for PE RC for PE she is in really no significant chest pain and no respiratory distress here so low concern for PE.   Possible UTI so will follow micro but otherwise soft and benign abdominal exam.     Presley Marinelli MD  12/28/21 2033

## 2021-12-30 LAB — URINE CULTURE, ROUTINE: NORMAL

## 2022-09-18 ENCOUNTER — HOSPITAL ENCOUNTER (EMERGENCY)
Age: 37
Discharge: HOME | End: 2022-09-18
Attending: STUDENT IN AN ORGANIZED HEALTH CARE EDUCATION/TRAINING PROGRAM
Payer: COMMERCIAL

## 2022-09-18 VITALS
TEMPERATURE: 99 F | HEIGHT: 64 IN | RESPIRATION RATE: 17 BRPM | BODY MASS INDEX: 26.98 KG/M2 | SYSTOLIC BLOOD PRESSURE: 98 MMHG | WEIGHT: 158 LBS | DIASTOLIC BLOOD PRESSURE: 66 MMHG | OXYGEN SATURATION: 95 % | HEART RATE: 84 BPM

## 2022-09-18 DIAGNOSIS — M62.838 SPASM OF MUSCLE: Primary | ICD-10-CM

## 2022-09-18 DIAGNOSIS — M54.50 ACUTE LEFT-SIDED LOW BACK PAIN, UNSPECIFIED WHETHER SCIATICA PRESENT: ICD-10-CM

## 2022-09-18 LAB
ALBUMIN SERPL-MCNC: 4.4 G/DL (ref 3.4–5)
ANION GAP SERPL CALCULATED.3IONS-SCNC: 11 MMOL/L (ref 3–16)
BACTERIA: ABNORMAL /HPF
BASOPHILS ABSOLUTE: 0 K/UL (ref 0–0.2)
BASOPHILS RELATIVE PERCENT: 0.7 %
BILIRUBIN URINE: NEGATIVE
BLOOD, URINE: ABNORMAL
BUN BLDV-MCNC: 10 MG/DL (ref 7–20)
CALCIUM SERPL-MCNC: 9.1 MG/DL (ref 8.3–10.6)
CHLORIDE BLD-SCNC: 103 MMOL/L (ref 99–110)
CLARITY: CLEAR
CO2: 24 MMOL/L (ref 21–32)
COLOR: YELLOW
CREAT SERPL-MCNC: 0.7 MG/DL (ref 0.6–1.1)
EOSINOPHILS ABSOLUTE: 0.1 K/UL (ref 0–0.6)
EOSINOPHILS RELATIVE PERCENT: 1.1 %
EPITHELIAL CELLS, UA: ABNORMAL /HPF (ref 0–5)
GFR AFRICAN AMERICAN: >60
GFR NON-AFRICAN AMERICAN: >60
GLUCOSE BLD-MCNC: 102 MG/DL (ref 70–99)
GLUCOSE URINE: NEGATIVE MG/DL
HCG(URINE) PREGNANCY TEST: NEGATIVE
HCT VFR BLD CALC: 36.5 % (ref 36–48)
HEMOGLOBIN: 11.8 G/DL (ref 12–16)
KETONES, URINE: NEGATIVE MG/DL
LEUKOCYTE ESTERASE, URINE: ABNORMAL
LYMPHOCYTES ABSOLUTE: 1.6 K/UL (ref 1–5.1)
LYMPHOCYTES RELATIVE PERCENT: 27.9 %
MCH RBC QN AUTO: 27.3 PG (ref 26–34)
MCHC RBC AUTO-ENTMCNC: 32.3 G/DL (ref 31–36)
MCV RBC AUTO: 84.4 FL (ref 80–100)
MICROSCOPIC EXAMINATION: YES
MONOCYTES ABSOLUTE: 0.3 K/UL (ref 0–1.3)
MONOCYTES RELATIVE PERCENT: 5.7 %
NEUTROPHILS ABSOLUTE: 3.6 K/UL (ref 1.7–7.7)
NEUTROPHILS RELATIVE PERCENT: 64.6 %
NITRITE, URINE: NEGATIVE
PDW BLD-RTO: 14.2 % (ref 12.4–15.4)
PH UA: 5.5 (ref 5–8)
PHOSPHORUS: 3.2 MG/DL (ref 2.5–4.9)
PLATELET # BLD: 349 K/UL (ref 135–450)
PMV BLD AUTO: 8.3 FL (ref 5–10.5)
POTASSIUM SERPL-SCNC: 4.2 MMOL/L (ref 3.5–5.1)
PROTEIN UA: NEGATIVE MG/DL
RBC # BLD: 4.32 M/UL (ref 4–5.2)
RBC UA: ABNORMAL /HPF (ref 0–4)
SODIUM BLD-SCNC: 138 MMOL/L (ref 136–145)
SPECIFIC GRAVITY UA: >=1.03 (ref 1–1.03)
URINE TYPE: ABNORMAL
UROBILINOGEN, URINE: 0.2 E.U./DL
WBC # BLD: 5.6 K/UL (ref 4–11)
WBC UA: ABNORMAL /HPF (ref 0–5)

## 2022-09-18 PROCEDURE — 99283 EMERGENCY DEPT VISIT LOW MDM: CPT

## 2022-09-18 PROCEDURE — 84703 CHORIONIC GONADOTROPIN ASSAY: CPT

## 2022-09-18 PROCEDURE — 80069 RENAL FUNCTION PANEL: CPT

## 2022-09-18 PROCEDURE — 87086 URINE CULTURE/COLONY COUNT: CPT

## 2022-09-18 PROCEDURE — 6370000000 HC RX 637 (ALT 250 FOR IP): Performed by: STUDENT IN AN ORGANIZED HEALTH CARE EDUCATION/TRAINING PROGRAM

## 2022-09-18 PROCEDURE — 85025 COMPLETE CBC W/AUTO DIFF WBC: CPT

## 2022-09-18 PROCEDURE — 81001 URINALYSIS AUTO W/SCOPE: CPT

## 2022-09-18 RX ORDER — METHOCARBAMOL 500 MG/1
500 TABLET, FILM COATED ORAL DAILY PRN
Qty: 10 TABLET | Refills: 0 | Status: SHIPPED | OUTPATIENT
Start: 2022-09-18 | End: 2022-09-28

## 2022-09-18 RX ORDER — LIDOCAINE 4 G/G
1 PATCH TOPICAL ONCE
Status: DISCONTINUED | OUTPATIENT
Start: 2022-09-18 | End: 2022-09-18 | Stop reason: HOSPADM

## 2022-09-18 RX ORDER — IBUPROFEN 400 MG/1
800 TABLET ORAL ONCE
Status: COMPLETED | OUTPATIENT
Start: 2022-09-18 | End: 2022-09-18

## 2022-09-18 RX ORDER — LIDOCAINE 50 MG/G
1 PATCH TOPICAL DAILY
Qty: 10 PATCH | Refills: 0 | Status: SHIPPED | OUTPATIENT
Start: 2022-09-18 | End: 2022-09-28

## 2022-09-18 RX ADMIN — IBUPROFEN 800 MG: 400 TABLET, FILM COATED ORAL at 14:40

## 2022-09-18 ASSESSMENT — ENCOUNTER SYMPTOMS
SORE THROAT: 0
ABDOMINAL PAIN: 0
SINUS PRESSURE: 0
CHEST TIGHTNESS: 0
RHINORRHEA: 0
VOMITING: 0
SHORTNESS OF BREATH: 0
NAUSEA: 0
CONSTIPATION: 0
SINUS PAIN: 0
DIARRHEA: 0
BACK PAIN: 1
CHOKING: 0
STRIDOR: 0
FACIAL SWELLING: 0

## 2022-09-18 ASSESSMENT — PAIN SCALES - GENERAL
PAINLEVEL_OUTOF10: 8
PAINLEVEL_OUTOF10: 0

## 2022-09-18 ASSESSMENT — PAIN DESCRIPTION - ORIENTATION: ORIENTATION: LOWER

## 2022-09-18 ASSESSMENT — PAIN DESCRIPTION - ONSET: ONSET: GRADUAL

## 2022-09-18 ASSESSMENT — PAIN DESCRIPTION - LOCATION: LOCATION: BACK

## 2022-09-18 ASSESSMENT — PAIN DESCRIPTION - FREQUENCY: FREQUENCY: INTERMITTENT

## 2022-09-18 ASSESSMENT — PAIN DESCRIPTION - DESCRIPTORS: DESCRIPTORS: PRESSURE

## 2022-09-18 ASSESSMENT — PAIN DESCRIPTION - PAIN TYPE: TYPE: ACUTE PAIN

## 2022-09-18 ASSESSMENT — PAIN - FUNCTIONAL ASSESSMENT: PAIN_FUNCTIONAL_ASSESSMENT: 0-10

## 2022-09-18 NOTE — ED PROVIDER NOTES
[address] [facility] RESIDENT NOTE       Date of evaluation: 9/18/2022    Chief Complaint     Back Pain (Mid lower back denies injury seen at urgent care Wed started on antibiotics spoke with urgent cars yesterday and they told her there was no growth in her urine)      History of Present Illness     Aby Alejandro is a 40 y.o. female with PMH of schizophrenia who presents to the ED with left-sided lower back pain. Patient reported back pain started 5 days back while she was at work and has since been constantly present. She describes the back pain as a left low back pain, which is constant, radiating to her hamstrings, dull in nature, relieved by heating packs, Tylenol, local massage. She denies any heavy lifting or localized trauma to the site. She initially took some Tylenol which was helping but got worried as the pain persisted. She denies any shocklike pain radiating at the bottom of her leg. Denies any urinary or bowel incontinence. Denies any burning micturition, urinary urgency or frequency. Denies any flank pain, abdominal pain. Review of Systems     Review of Systems   Constitutional:  Negative for activity change, appetite change, chills, diaphoresis, fatigue, fever and unexpected weight change. HENT:  Negative for ear pain, facial swelling, rhinorrhea, sinus pressure, sinus pain and sore throat. Respiratory:  Negative for choking, chest tightness, shortness of breath and stridor. Gastrointestinal:  Negative for abdominal pain, constipation, diarrhea, nausea and vomiting. Endocrine: Negative for polydipsia and polyuria. Genitourinary:  Negative for decreased urine volume, difficulty urinating, dyspareunia, dysuria, frequency, hematuria, menstrual problem, pelvic pain and urgency. Musculoskeletal:  Positive for back pain and myalgias. Negative for arthralgias, gait problem, joint swelling, neck pain and neck stiffness. Neurological:  Negative for dizziness, tremors, seizures, syncope, facial asymmetry, speech difficulty, weakness, light-headedness, numbness and headaches. Past Medical, Surgical, Family, and Social History     She has a past medical history of Schizophrenia ([address] and Seasonal allergies. She has a past surgical history that includes Dilation and curettage of uterus and Induced . Her family history is not on file. She reports that she has never smoked. She has never used smokeless tobacco. She reports current alcohol use. She reports that she does not use drugs. Medications     Discharge Medication List as of 2022  5:14 PM        CONTINUE these medications which have NOT CHANGED    Details   naproxen (NAPROSYN) 500 MG tablet Take 1 tablet by mouth 2 times daily as needed for Pain, Disp-20 tablet, R-0Print             Allergies     She is allergic to methotrexate and other. Physical Exam     INITIAL VITALS: BP: 109/60, Temp: 99 °F (37.2 °C), Heart Rate: 84, Resp: 17, SpO2: 98 %   Physical Exam  Constitutional:       Appearance: Normal appearance. HENT:      Head: Normocephalic and atraumatic. Nose: Nose normal.      Mouth/Throat:      Mouth: Mucous membranes are moist.      Pharynx: Oropharynx is clear. Eyes:      Extraocular Movements: Extraocular movements intact. Conjunctiva/sclera: Conjunctivae normal.      Pupils: Pupils are equal, round, and reactive to light. Cardiovascular:      Rate and Rhythm: Normal rate and regular rhythm. Pulses: Normal pulses. Heart sounds: Normal heart sounds. Pulmonary:      Effort: Pulmonary effort is normal.      Breath sounds: Normal breath sounds. Abdominal:      General: Bowel sounds are normal.      Palpations: Abdomen is soft. Musculoskeletal:         General: No swelling, tenderness, deformity or signs of injury. Normal range of motion. Cervical back: Normal range of motion. Skin:     General: Skin is warm. Nitrite, Urine Negative Negative    Leukocyte Esterase, Urine SMALL (A) Negative    Microscopic Examination YES     Urine Type NotGiven     WBC, UA 6-9 (A) 0 - 5 /HPF    RBC, UA 21-50 (A) 0 - 4 /HPF    Epithelial Cells, UA 11-20 (A) 0 - 5 /HPF    Bacteria, UA Rare (A) None Seen /HPF   Pregnancy, urine   Result Value Ref Range    HCG(Urine) Pregnancy Test Negative Detects HCG level >20 MIU/mL       ED BEDSIDE ULTRASOUND:  No results found. RECENT VITALS:  BP: 98/66, Temp: 99 °F (37.2 °C),Heart Rate: 84, Resp: 17, SpO2: 95 %     Procedures     none    ED Course     Nursing Notes, Past Medical Hx, Past Surgical Hx, Social Hx, Allergies, and FamilyHx were reviewed. The patient was giventhe following medications:  Orders Placed This Encounter   Medications    ibuprofen (ADVIL;MOTRIN) tablet 800 mg    lidocaine 4 % external patch 1 patch    lidocaine (LIDODERM) 5 %     Sig: Place 1 patch onto the skin daily for 10 days 12 hours on, 12 hours off. Dispense:  10 patch     Refill:  0    methocarbamol (ROBAXIN) 500 MG tablet     Sig: Take 1 tablet by mouth daily as needed (pain)     Dispense:  10 tablet     Refill:  0       CONSULTS:  None    MEDICAL DECISION MAKING / ASSESSMENT / Duchesne Sean is a 40 y.o. female PMH of schizophrenia who presents to the ED with left-sided lower back pain. In the ED patient vitals were stable. On examination patient mild tenderness paraspinal left lower back that improved with pressure. She reported she tried warm compresses which had helped with the pain. In the ED I gave lidocaine as well as ibuprofen 800. CBC and RFP were nonrevealing. Urine pregnancy is negative. UA revealed large blood likely secondary to menstruation. Otherwise UA was dirty/contaminated given epithelial cells 11-20. On my reassessment patient reported the pain is significantly better after the lidocaine patch and ibuprofen.   Patient was advised to follow-up with a primary care physician/get established with the [facility] resident clinic if she does not have a primary care physician. She was given a prescription for lidocaine patch and Robaxin 500 for 10 days as needed. Patient was asked to return back to the ED if worsening back pain with incontinence of urine or bowel movement or weakness of her lower extremities. Patient was discharged from the ED under stable condition. This patient was also evaluated by the attending physician. All care plans were discussed and agreed upon. Clinical Impression     1. Spasm of muscle    2.  Acute left-sided low back pain, unspecified whether sciatica present        Disposition     PATIENT REFERRED TO:  [facility] AT [place]  [address] [facility] [address] [facility] [address] [facility]    Schedule an appointment as soon as possible for a visit in 1 week      DISCHARGE MEDICATIONS:  Discharge Medication List as of 9/18/2022  5:14 PM        START taking these medications    Details   lidocaine (LIDODERM) 5 % Place 1 patch onto the skin daily for 10 days 12 hours on, 12 hours off., Disp-10 patch, R-0Print      methocarbamol (ROBAXIN) 500 MG tablet Take 1 tablet by mouth daily as needed (pain), Disp-10 tablet, R-0Print             DISPOSITION Decision To Discharge 09/18/2022 04:59:36 PM       Severiano Coyer, MD  Resident  09/18/22 4982

## 2022-09-18 NOTE — ED PROVIDER NOTES
ED Attending Attestation Note     Date of evaluation: 9/18/2022    This patient was seen by the resident. I have seen and examined the patient, agree with the workup, evaluation, management and diagnosis. The care plan has been discussed. My assessment reveals a well-appearing 26-year-old female who presents with left-sided well-appearing 26-year-old female who presents with left-sided back discomfort. He was reportedly evaluated in urgent care and had a negative urine recently. The pain is positional and started when she was standing for prolonged time at work. On exam, she has minimal reproducible paraspinal tenderness with no neurologic deficits in the lower extremities. Overall suspicion for cauda equina syndrome is very low given lack of urinary retention, saddle anesthesia or weakness in the lower extremities. Patient very worried about her kidney function, lab work was obtained for reassurance. Her urine did show blood but she is currently on her menstrual cycle, do not think this represents kidney stone. Patient was provided symptomatic relief and had complete resolution of her pain at the time of discharge. Suspect this is musculoskeletal in etiology.      Tawanna Palmer MD  09/18/22 0081

## 2022-09-19 LAB — URINE CULTURE, ROUTINE: NORMAL

## 2023-05-30 ENCOUNTER — HOSPITAL ENCOUNTER (EMERGENCY)
Age: 38
Discharge: HOME | End: 2023-05-30
Attending: STUDENT IN AN ORGANIZED HEALTH CARE EDUCATION/TRAINING PROGRAM
Payer: COMMERCIAL

## 2023-05-30 VITALS
RESPIRATION RATE: 17 BRPM | OXYGEN SATURATION: 100 % | TEMPERATURE: 98.4 F | SYSTOLIC BLOOD PRESSURE: 108 MMHG | HEART RATE: 81 BPM | WEIGHT: 174.16 LBS | DIASTOLIC BLOOD PRESSURE: 66 MMHG | BODY MASS INDEX: 29.9 KG/M2

## 2023-05-30 DIAGNOSIS — N93.9 ABNORMAL UTERINE BLEEDING: Primary | ICD-10-CM

## 2023-05-30 LAB
ABO + RH BLD: NORMAL
ANION GAP SERPL CALCULATED.3IONS-SCNC: 10 MMOL/L (ref 3–16)
BACTERIA GENITAL QL WET PREP: NORMAL
BACTERIA URNS QL MICRO: ABNORMAL /HPF
BASOPHILS # BLD: 0 K/UL (ref 0–0.2)
BASOPHILS NFR BLD: 0.5 %
BILIRUB UR QL STRIP.AUTO: NEGATIVE
BLD GP AB SCN SERPL QL: NORMAL
BUN SERPL-MCNC: 10 MG/DL (ref 7–20)
CALCIUM SERPL-MCNC: 9.2 MG/DL (ref 8.3–10.6)
CHLORIDE SERPL-SCNC: 99 MMOL/L (ref 99–110)
CLARITY UR: CLEAR
CLUE CELLS SPEC QL WET PREP: NORMAL
CO2 SERPL-SCNC: 23 MMOL/L (ref 21–32)
COLOR UR: YELLOW
CREAT SERPL-MCNC: 0.8 MG/DL (ref 0.6–1.1)
DEPRECATED RDW RBC AUTO: 20.1 % (ref 12.4–15.4)
EOSINOPHIL # BLD: 0 K/UL (ref 0–0.6)
EOSINOPHIL NFR BLD: 0.4 %
EPI CELLS SPEC QL WET PREP: NORMAL
GFR SERPLBLD CREATININE-BSD FMLA CKD-EPI: >60 ML/MIN/{1.73_M2}
GLUCOSE SERPL-MCNC: 100 MG/DL (ref 70–99)
GLUCOSE UR STRIP.AUTO-MCNC: NEGATIVE MG/DL
HCG UR QL: NEGATIVE
HCT VFR BLD AUTO: 32.8 % (ref 36–48)
HGB BLD-MCNC: 10.7 G/DL (ref 12–16)
HGB UR QL STRIP.AUTO: ABNORMAL
KETONES UR STRIP.AUTO-MCNC: ABNORMAL MG/DL
LEUKOCYTE ESTERASE UR QL STRIP.AUTO: NEGATIVE
LYMPHOCYTES # BLD: 1.5 K/UL (ref 1–5.1)
LYMPHOCYTES NFR BLD: 18 %
MCH RBC QN AUTO: 26.1 PG (ref 26–34)
MCHC RBC AUTO-ENTMCNC: 32.6 G/DL (ref 31–36)
MCV RBC AUTO: 80 FL (ref 80–100)
MONOCYTES # BLD: 0.5 K/UL (ref 0–1.3)
MONOCYTES NFR BLD: 5.4 %
NEUTROPHILS # BLD: 6.4 K/UL (ref 1.7–7.7)
NEUTROPHILS NFR BLD: 75.7 %
NITRITE UR QL STRIP.AUTO: POSITIVE
PH UR STRIP.AUTO: 6.5 [PH] (ref 5–8)
PLATELET # BLD AUTO: 336 K/UL (ref 135–450)
PMV BLD AUTO: 8.3 FL (ref 5–10.5)
POTASSIUM SERPL-SCNC: 3.9 MMOL/L (ref 3.5–5.1)
PROT UR STRIP.AUTO-MCNC: 100 MG/DL
RBC # BLD AUTO: 4.11 M/UL (ref 4–5.2)
RBC #/AREA URNS HPF: >100 /HPF (ref 0–4)
RBC SPEC QL WET PREP: NORMAL
SODIUM SERPL-SCNC: 132 MMOL/L (ref 136–145)
SP GR UR STRIP.AUTO: >=1.03 (ref 1–1.03)
SPECIMEN SOURCE FLD: NORMAL
T VAGINALIS GENITAL QL WET PREP: NORMAL
UA COMPLETE W REFLEX CULTURE PNL UR: ABNORMAL
UA DIPSTICK W REFLEX MICRO PNL UR: YES
URN SPEC COLLECT METH UR: ABNORMAL
UROBILINOGEN UR STRIP-ACNC: 0.2 E.U./DL
WBC # BLD AUTO: 8.4 K/UL (ref 4–11)
WBC #/AREA URNS HPF: ABNORMAL /HPF (ref 0–5)
WBC SPEC QL WET PREP: NORMAL
YEAST GENITAL QL WET PREP: NORMAL

## 2023-05-30 PROCEDURE — 87591 N.GONORRHOEAE DNA AMP PROB: CPT

## 2023-05-30 PROCEDURE — 99283 EMERGENCY DEPT VISIT LOW MDM: CPT

## 2023-05-30 PROCEDURE — 80048 BASIC METABOLIC PNL TOTAL CA: CPT

## 2023-05-30 PROCEDURE — 86900 BLOOD TYPING SEROLOGIC ABO: CPT

## 2023-05-30 PROCEDURE — 87491 CHLMYD TRACH DNA AMP PROBE: CPT

## 2023-05-30 PROCEDURE — 86901 BLOOD TYPING SEROLOGIC RH(D): CPT

## 2023-05-30 PROCEDURE — 85025 COMPLETE CBC W/AUTO DIFF WBC: CPT

## 2023-05-30 PROCEDURE — 87210 SMEAR WET MOUNT SALINE/INK: CPT

## 2023-05-30 PROCEDURE — 84703 CHORIONIC GONADOTROPIN ASSAY: CPT

## 2023-05-30 PROCEDURE — 86850 RBC ANTIBODY SCREEN: CPT

## 2023-05-30 PROCEDURE — 81001 URINALYSIS AUTO W/SCOPE: CPT

## 2023-05-30 RX ORDER — MEDROXYPROGESTERONE ACETATE 10 MG/1
20 TABLET ORAL 3 TIMES DAILY
Qty: 42 TABLET | Refills: 0 | Status: SHIPPED | OUTPATIENT
Start: 2023-05-30 | End: 2023-06-06

## 2023-05-30 ASSESSMENT — PAIN - FUNCTIONAL ASSESSMENT: PAIN_FUNCTIONAL_ASSESSMENT: NONE - DENIES PAIN

## 2023-05-30 NOTE — ED NOTES
Patient discharged to home via family. Written discharge instructions reviewed with understanding. Copy of AVS and signed prescription sent home with patient. Patient able to walk from ED without assistance.         Krystal Yuan RN  05/30/23 0651

## 2023-05-31 LAB
C TRACH DNA CVX QL NAA+PROBE: NEGATIVE
N GONORRHOEA DNA CERV MUCUS QL NAA+PROBE: NEGATIVE

## 2023-06-06 NOTE — ED PROVIDER NOTES
ED Attending Attestation Note     Date of evaluation: 5/30/2023    This patient was seen by the resident. I have seen and examined the patient, agree with the workup, evaluation, management and diagnosis. The care plan has been discussed. My assessment reveals a well-appearing 70-year-old female presenting with 3 weeks of vaginal bleeding, with increased passage of clots over the past 2 days. She has had some intermittent lightheadedness but has not passed out. She does not think she could be pregnant. She is not currently on any form of birth control and typically has regular periods, apart from when she was immediately postpartum. On arrival, she was hemodynamically stable and in no acute distress. Her abdomen was soft and nontender. She did have evidence of ongoing uterine bleeding on the pelvic exam performed by the resident physician. Labs showed a hemoglobin of 10.7. Her urinalysis was nitrite positive, however she has no urinary symptoms and had large blood; treatment for UTI was deferred pending culture. Patient does have outpatient gynecology follow-up, however this is not until next month. Her care was discussed with Gynecology and she will be started on a 7-day course of medroxyprogesterone. She was also advised to call Gynecology here as they may be able to schedule an earlier outpatient appointment. Patient indicated her understanding and was amenable to this plan.      Angelica Calderon MD  05/30/23 2539
non-toxic  Head: normocephalic, atraumatic   Eyes: anicteric, PERRL, EOMI   ENT: no nasal discharge, mucous membranes are moist   Neck: supple, full ROM, trachea midline   Pulmonary: lungs clear to auscultation bilaterally with symmetric aeration, no wheezes/rales/rhonchi, no stridor   Cardiac: regular rate and regular rhythm, no murmurs/rubs/gallops   Abdomen: soft, non-tender, non-distended  Pelvic: normal appearing external female genitalia, normal vaginal epithelium, there is a moderate amount of dark blood present in the vaginal vault with clots present normal appearing cervix, no palpable adnexal masses. Exam chaperoned by bedside RN.    Extremities: warm and well-perfused, no apparent long bone deformity, no peripheral edema  Skin: no rashes or ecchymosis   Neuro: alert and oriented x3, speech normal, moves upper and lower extremities spontaneously and symmetrically, ambulates without impairment   Psych: mood and affect appropriate for situation     DiagnosticResults   LABS:   Results for orders placed or performed during the hospital encounter of 05/30/23   Wet prep, genital    Specimen: Vaginal   Result Value Ref Range    Trichomonas Prep None Seen     Yeast, Wet Prep None Seen     Clue Cells, Wet Prep None Seen     WBC, Wet Prep <1+     RBC, Wet Prep 4+     Epi Cells <1+     Bacteria None Seen     Source Wet Prep Vaginal    C.trachomatis N.gonorrhoeae DNA    Specimen: Vaginal   Result Value Ref Range    C. trachomatis DNA Negative Negative    N. gonorrhoeae DNA Negative Negative   CBC with Auto Differential   Result Value Ref Range    WBC 8.4 4.0 - 11.0 K/uL    RBC 4.11 4.00 - 5.20 M/uL    Hemoglobin 10.7 (L) 12.0 - 16.0 g/dL    Hematocrit 32.8 (L) 36.0 - 48.0 %    MCV 80.0 80.0 - 100.0 fL    MCH 26.1 26.0 - 34.0 pg    MCHC 32.6 31.0 - 36.0 g/dL    RDW 20.1 (H) 12.4 - 15.4 %    Platelets 394 347 - 858 K/uL    MPV 8.3 5.0 - 10.5 fL    Neutrophils % 75.7 %    Lymphocytes % 18.0 %    Monocytes % 5.4 %

## 2023-06-19 ENCOUNTER — HOSPITAL ENCOUNTER (INPATIENT)
Age: 38
LOS: 2 days | Discharge: HOME | DRG: 760 | End: 2023-06-21
Attending: EMERGENCY MEDICINE | Admitting: ANESTHESIOLOGY
Payer: COMMERCIAL

## 2023-06-19 DIAGNOSIS — D64.9 LOW HEMOGLOBIN: ICD-10-CM

## 2023-06-19 DIAGNOSIS — N93.9 VAGINAL BLEEDING: Primary | ICD-10-CM

## 2023-06-19 LAB
ABO + RH BLD: NORMAL
ANION GAP SERPL CALCULATED.3IONS-SCNC: 9 MMOL/L (ref 3–16)
BASOPHILS # BLD: 0.1 K/UL (ref 0–0.2)
BASOPHILS NFR BLD: 1 %
BLD GP AB SCN SERPL QL: NORMAL
BLOOD BANK DISPENSE STATUS: NORMAL
BLOOD BANK DISPENSE STATUS: NORMAL
BLOOD BANK PRODUCT CODE: NORMAL
BLOOD BANK PRODUCT CODE: NORMAL
BPU ID: NORMAL
BPU ID: NORMAL
BUN SERPL-MCNC: 12 MG/DL (ref 7–20)
CALCIUM SERPL-MCNC: 8.8 MG/DL (ref 8.3–10.6)
CHLORIDE SERPL-SCNC: 103 MMOL/L (ref 99–110)
CO2 SERPL-SCNC: 25 MMOL/L (ref 21–32)
CREAT SERPL-MCNC: 0.7 MG/DL (ref 0.6–1.1)
DEPRECATED RDW RBC AUTO: 18.1 % (ref 12.4–15.4)
DESCRIPTION BLOOD BANK: NORMAL
DESCRIPTION BLOOD BANK: NORMAL
EOSINOPHIL # BLD: 0.1 K/UL (ref 0–0.6)
EOSINOPHIL NFR BLD: 0.7 %
GFR SERPLBLD CREATININE-BSD FMLA CKD-EPI: >60 ML/MIN/{1.73_M2}
GLUCOSE SERPL-MCNC: 127 MG/DL (ref 70–99)
HCG SERPL QL: NEGATIVE
HCT VFR BLD AUTO: 20.6 % (ref 36–48)
HGB BLD-MCNC: 6.7 G/DL (ref 12–16)
LYMPHOCYTES # BLD: 2.1 K/UL (ref 1–5.1)
LYMPHOCYTES NFR BLD: 26.2 %
MCH RBC QN AUTO: 26.1 PG (ref 26–34)
MCHC RBC AUTO-ENTMCNC: 32.8 G/DL (ref 31–36)
MCV RBC AUTO: 79.6 FL (ref 80–100)
MONOCYTES # BLD: 0.4 K/UL (ref 0–1.3)
MONOCYTES NFR BLD: 5.4 %
NEUTROPHILS # BLD: 5.5 K/UL (ref 1.7–7.7)
NEUTROPHILS NFR BLD: 66.7 %
PLATELET # BLD AUTO: 462 K/UL (ref 135–450)
PMV BLD AUTO: 8.2 FL (ref 5–10.5)
POTASSIUM SERPL-SCNC: 3.6 MMOL/L (ref 3.5–5.1)
RBC # BLD AUTO: 2.59 M/UL (ref 4–5.2)
SODIUM SERPL-SCNC: 137 MMOL/L (ref 136–145)
WBC # BLD AUTO: 8.2 K/UL (ref 4–11)

## 2023-06-19 PROCEDURE — 86850 RBC ANTIBODY SCREEN: CPT

## 2023-06-19 PROCEDURE — 6370000000 HC RX 637 (ALT 250 FOR IP): Performed by: NURSE PRACTITIONER

## 2023-06-19 PROCEDURE — 99285 EMERGENCY DEPT VISIT HI MDM: CPT

## 2023-06-19 PROCEDURE — 85025 COMPLETE CBC W/AUTO DIFF WBC: CPT

## 2023-06-19 PROCEDURE — P9016 RBC LEUKOCYTES REDUCED: HCPCS

## 2023-06-19 PROCEDURE — 80048 BASIC METABOLIC PNL TOTAL CA: CPT

## 2023-06-19 PROCEDURE — 86901 BLOOD TYPING SEROLOGIC RH(D): CPT

## 2023-06-19 PROCEDURE — 86900 BLOOD TYPING SEROLOGIC ABO: CPT

## 2023-06-19 PROCEDURE — 2060000000 HC ICU INTERMEDIATE R&B

## 2023-06-19 PROCEDURE — 36430 TRANSFUSION BLD/BLD COMPNT: CPT

## 2023-06-19 PROCEDURE — 84703 CHORIONIC GONADOTROPIN ASSAY: CPT

## 2023-06-19 PROCEDURE — 2580000003 HC RX 258: Performed by: ANESTHESIOLOGY

## 2023-06-19 PROCEDURE — 86923 COMPATIBILITY TEST ELECTRIC: CPT

## 2023-06-19 PROCEDURE — 36415 COLL VENOUS BLD VENIPUNCTURE: CPT

## 2023-06-19 RX ORDER — POLYETHYLENE GLYCOL 3350 17 G/17G
17 POWDER, FOR SOLUTION ORAL DAILY PRN
Status: DISCONTINUED | OUTPATIENT
Start: 2023-06-19 | End: 2023-06-21 | Stop reason: HOSPADM

## 2023-06-19 RX ORDER — FERROUS SULFATE 325(65) MG
325 TABLET ORAL
COMMUNITY

## 2023-06-19 RX ORDER — MEDROXYPROGESTERONE ACETATE 10 MG/1
20 TABLET ORAL 3 TIMES DAILY
Status: DISCONTINUED | OUTPATIENT
Start: 2023-06-19 | End: 2023-06-19

## 2023-06-19 RX ORDER — ARIPIPRAZOLE 400 MG
KIT INTRAMUSCULAR
COMMUNITY
Start: 2023-05-11

## 2023-06-19 RX ORDER — SODIUM CHLORIDE 9 MG/ML
INJECTION, SOLUTION INTRAVENOUS PRN
Status: DISCONTINUED | OUTPATIENT
Start: 2023-06-19 | End: 2023-06-21 | Stop reason: HOSPADM

## 2023-06-19 RX ORDER — SODIUM CHLORIDE 0.9 % (FLUSH) 0.9 %
5-40 SYRINGE (ML) INJECTION PRN
Status: DISCONTINUED | OUTPATIENT
Start: 2023-06-19 | End: 2023-06-21 | Stop reason: HOSPADM

## 2023-06-19 RX ORDER — ACETAMINOPHEN 325 MG/1
650 TABLET ORAL EVERY 6 HOURS PRN
Status: DISCONTINUED | OUTPATIENT
Start: 2023-06-19 | End: 2023-06-21 | Stop reason: HOSPADM

## 2023-06-19 RX ORDER — ONDANSETRON 2 MG/ML
4 INJECTION INTRAMUSCULAR; INTRAVENOUS EVERY 6 HOURS PRN
Status: DISCONTINUED | OUTPATIENT
Start: 2023-06-19 | End: 2023-06-21 | Stop reason: HOSPADM

## 2023-06-19 RX ORDER — MEDROXYPROGESTERONE ACETATE 10 MG/1
10 TABLET ORAL ONCE
Status: COMPLETED | OUTPATIENT
Start: 2023-06-19 | End: 2023-06-19

## 2023-06-19 RX ORDER — MEDROXYPROGESTERONE ACETATE 10 MG/1
10 TABLET ORAL ONCE
Status: DISCONTINUED | OUTPATIENT
Start: 2023-06-19 | End: 2023-06-19

## 2023-06-19 RX ORDER — ONDANSETRON 4 MG/1
4 TABLET, ORALLY DISINTEGRATING ORAL EVERY 8 HOURS PRN
Status: DISCONTINUED | OUTPATIENT
Start: 2023-06-19 | End: 2023-06-21 | Stop reason: HOSPADM

## 2023-06-19 RX ORDER — SODIUM CHLORIDE 0.9 % (FLUSH) 0.9 %
5-40 SYRINGE (ML) INJECTION EVERY 12 HOURS SCHEDULED
Status: DISCONTINUED | OUTPATIENT
Start: 2023-06-19 | End: 2023-06-21 | Stop reason: HOSPADM

## 2023-06-19 RX ORDER — ACETAMINOPHEN 650 MG/1
650 SUPPOSITORY RECTAL EVERY 6 HOURS PRN
Status: DISCONTINUED | OUTPATIENT
Start: 2023-06-19 | End: 2023-06-21 | Stop reason: HOSPADM

## 2023-06-19 RX ORDER — SODIUM CHLORIDE 9 MG/ML
INJECTION, SOLUTION INTRAVENOUS CONTINUOUS
Status: ACTIVE | OUTPATIENT
Start: 2023-06-19 | End: 2023-06-20

## 2023-06-19 RX ADMIN — SODIUM CHLORIDE, PRESERVATIVE FREE 10 ML: 5 INJECTION INTRAVENOUS at 23:54

## 2023-06-19 RX ADMIN — MEDROXYPROGESTERONE ACETATE 10 MG: 10 TABLET ORAL at 23:52

## 2023-06-19 ASSESSMENT — PAIN SCALES - GENERAL
PAINLEVEL_OUTOF10: 0

## 2023-06-19 ASSESSMENT — PAIN - FUNCTIONAL ASSESSMENT: PAIN_FUNCTIONAL_ASSESSMENT: NONE - DENIES PAIN

## 2023-06-19 NOTE — ED PROVIDER NOTES
ED Attending Attestation Note     Date of evaluation: 6/19/2023    This patient was seen by the advance practice provider. I have seen and examined the patient, agree with the workup, evaluation, management and diagnosis. The care plan has been discussed. My assessment reveals patient here with protracted vaginal bleeding despite being started on birth control. Hemoglobin was noted to be 6.7, which is a fairly significant drop from prior. She was 10.7 previously. I am recommending transfusion but patient is still deciding and follow-up note will be placed with her ultimate decision and plan.      Oliver Ladd MD  06/19/23 5976

## 2023-06-19 NOTE — ED PROVIDER NOTES
[address] [facility] [address] ENCOUNTER          PHYSICIAN ASSISTANT NOTE       Date of evaluation: 2023    Chief Complaint     Vaginal Bleeding (Pt reports bleeding x1 week. Pt reports she is passing clots.)      History of Present Illness     Robinson See is a 40 y.o. female who presents with vaginal bleeding. Patient states that she was seen here on 2023 for vaginal bleeding for 3 weeks. She was given a prescription for birth control and told to follow-up with her gynecologist.  Her gynecologist is unable to see her until 2023. She took the birth control for 1 week and this did stop her bleeding. 1 week ago, started bleeding again. Has been filling up 1 overnight pad every 2-3 hours. Endorses passing large clots. Endorses mild abdominal cramping yesterday, with no current abdominal pain. Is not currently sexually active. Was last actually active 3 years ago. Not currently using any birth control. Denies fever, chills, nausea, vomiting, chest pain, shortness of breath, abdominal pain, or any changes to bowel or bladder habits. Review of Systems     Review of Systems see above for pertinent ROS. Past Medical, Surgical, Family, and Social History     She has a past medical history of Schizophrenia ([facility] [address] and Seasonal allergies. She has a past surgical history that includes Dilation and curettage of uterus and Induced . Her family history is not on file. She reports that she has never smoked. She has never used smokeless tobacco. She reports that she does not currently use alcohol. She reports that she does not use drugs. Medications     Previous Medications    MEDROXYPROGESTERONE (PROVERA) 10 MG TABLET    Take 2 tablets by mouth in the morning, at noon, and at bedtime for 7 days    NAPROXEN (NAPROSYN) 500 MG TABLET    Take 1 tablet by mouth 2 times daily as needed for Pain       Allergies     She is allergic to methotrexate.     Physical Exam     INITIAL

## 2023-06-20 ENCOUNTER — APPOINTMENT (OUTPATIENT)
Dept: ULTRASOUND IMAGING | Age: 38
DRG: 760 | End: 2023-06-20
Payer: COMMERCIAL

## 2023-06-20 LAB
ANION GAP SERPL CALCULATED.3IONS-SCNC: 10 MMOL/L (ref 3–16)
BASOPHILS # BLD: 0.1 K/UL (ref 0–0.2)
BASOPHILS NFR BLD: 1 %
BUN SERPL-MCNC: 12 MG/DL (ref 7–20)
CALCIUM SERPL-MCNC: 8.4 MG/DL (ref 8.3–10.6)
CHLORIDE SERPL-SCNC: 106 MMOL/L (ref 99–110)
CO2 SERPL-SCNC: 22 MMOL/L (ref 21–32)
CREAT SERPL-MCNC: 0.7 MG/DL (ref 0.6–1.1)
DEPRECATED RDW RBC AUTO: 17.9 % (ref 12.4–15.4)
EOSINOPHIL # BLD: 0.1 K/UL (ref 0–0.6)
EOSINOPHIL NFR BLD: 1.2 %
GFR SERPLBLD CREATININE-BSD FMLA CKD-EPI: >60 ML/MIN/{1.73_M2}
GLUCOSE SERPL-MCNC: 100 MG/DL (ref 70–99)
HCT VFR BLD AUTO: 24 % (ref 36–48)
HGB BLD-MCNC: 7.9 G/DL (ref 12–16)
LYMPHOCYTES # BLD: 2.1 K/UL (ref 1–5.1)
LYMPHOCYTES NFR BLD: 25.9 %
MCH RBC QN AUTO: 26.3 PG (ref 26–34)
MCHC RBC AUTO-ENTMCNC: 32.8 G/DL (ref 31–36)
MCV RBC AUTO: 80.4 FL (ref 80–100)
MONOCYTES # BLD: 0.4 K/UL (ref 0–1.3)
MONOCYTES NFR BLD: 5.6 %
NEUTROPHILS # BLD: 5.2 K/UL (ref 1.7–7.7)
NEUTROPHILS NFR BLD: 66.3 %
PLATELET # BLD AUTO: 356 K/UL (ref 135–450)
PMV BLD AUTO: 7.9 FL (ref 5–10.5)
POTASSIUM SERPL-SCNC: 4.3 MMOL/L (ref 3.5–5.1)
RBC # BLD AUTO: 2.99 M/UL (ref 4–5.2)
SODIUM SERPL-SCNC: 138 MMOL/L (ref 136–145)
WBC # BLD AUTO: 7.9 K/UL (ref 4–11)

## 2023-06-20 PROCEDURE — 2580000003 HC RX 258: Performed by: ANESTHESIOLOGY

## 2023-06-20 PROCEDURE — 85025 COMPLETE CBC W/AUTO DIFF WBC: CPT

## 2023-06-20 PROCEDURE — 80048 BASIC METABOLIC PNL TOTAL CA: CPT

## 2023-06-20 PROCEDURE — 76830 TRANSVAGINAL US NON-OB: CPT

## 2023-06-20 PROCEDURE — 76856 US EXAM PELVIC COMPLETE: CPT

## 2023-06-20 PROCEDURE — 36415 COLL VENOUS BLD VENIPUNCTURE: CPT

## 2023-06-20 PROCEDURE — 6370000000 HC RX 637 (ALT 250 FOR IP): Performed by: ANESTHESIOLOGY

## 2023-06-20 PROCEDURE — 2060000000 HC ICU INTERMEDIATE R&B

## 2023-06-20 RX ADMIN — SODIUM CHLORIDE: 9 INJECTION, SOLUTION INTRAVENOUS at 13:50

## 2023-06-20 RX ADMIN — SODIUM CHLORIDE, PRESERVATIVE FREE 10 ML: 5 INJECTION INTRAVENOUS at 08:37

## 2023-06-20 RX ADMIN — ACETAMINOPHEN 650 MG: 325 TABLET ORAL at 09:47

## 2023-06-20 RX ADMIN — SODIUM CHLORIDE: 9 INJECTION, SOLUTION INTRAVENOUS at 03:05

## 2023-06-20 RX ADMIN — SODIUM CHLORIDE, PRESERVATIVE FREE 10 ML: 5 INJECTION INTRAVENOUS at 22:00

## 2023-06-20 ASSESSMENT — PAIN SCALES - GENERAL
PAINLEVEL_OUTOF10: 3
PAINLEVEL_OUTOF10: 0
PAINLEVEL_OUTOF10: 3

## 2023-06-20 ASSESSMENT — PAIN DESCRIPTION - DESCRIPTORS: DESCRIPTORS: DISCOMFORT

## 2023-06-20 ASSESSMENT — PAIN DESCRIPTION - ORIENTATION: ORIENTATION: ANTERIOR

## 2023-06-20 ASSESSMENT — PAIN - FUNCTIONAL ASSESSMENT: PAIN_FUNCTIONAL_ASSESSMENT: ACTIVITIES ARE NOT PREVENTED

## 2023-06-20 ASSESSMENT — PAIN DESCRIPTION - LOCATION: LOCATION: HEAD

## 2023-06-20 NOTE — H&P
V2.0  History and Physical      Name:  Gurwinder Chen /Age/Sex: 1985  (40 y.o. female)   MRN & CSN:  [id number] & [id number] Encounter Date/Time: 2023 8:00 PM EDT   [facility]: PROVIDER UNKNOWN, [address] Day: 1    Assessment and Plan:   Acute blood loss anemia likely due to vaginal bleeding: Gynecology is following, 2 units of packed RBC started ED, will monitor CBC, IVF, pelvic ultrasound    Code: Full  DVT proph: SCD for      History of Present Illness:     Chief Complaint: vaginal bleeding  Gurwinder Chen is a 40 y.o. female who is presenting with progressive vaginal bleeding. Patient states that bleeding started 3 weeks ago and she presented to the ED and she was prescribed OCP. Patient states that 1 week ago the bleeding got worse. She had abdominal cramping yesterday. When I saw patient, she was comfortable in bed, awake alert oriented x3 on room air. Patient reports generalized fatigue. She said that that she still having vaginal bleeding. Patient denies fever, chills, chest pain, dizziness, bloody bowel movements and hematuria. In the ED vitals were stable. Labs showed hemoglobin 6.7, platelets 742.  2 units of RBCs ordered. Gynecology consulted. Review of Systems:        Pertinent positives and negatives discussed in HPI     Objective:   No intake or output data in the 24 hours ending 23   Vitals:   Vitals:    23 1455 23 1931 23 1945   BP: 103/65 107/69 (!) 115/56   Pulse: 99 81 92   Resp: 18 16 16   Temp: 98.2 °F (36.8 °C) 98.3 °F (36.8 °C) 98.3 °F (36.8 °C)   SpO2: 99% 100% 100%       Medications Prior to Admission     Prior to Admission medications    Medication Sig Start Date End Date Taking?  Authorizing Provider   medroxyPROGESTERone (PROVERA) 10 MG tablet Take 2 tablets by mouth in the morning, at noon, and at bedtime for 7 days 23  Sena Lindsay MD   naproxen (NAPROSYN) 500 MG tablet Take 1

## 2023-06-20 NOTE — ED PROVIDER NOTES
[address] [facility] [address] ENCOUNTER          PHYSICIAN ASSISTANT NOTE     Date of evaluation: 6/19/2023    ADDENDUM:      Care of this patient was assumed from [facility]. The patient was seen for Vaginal Bleeding (Pt reports bleeding x1 week. Pt reports she is passing clots.)  . The patient's initial evaluation and plan have been discussed with the prior provider who initially evaluated the patient. Nursing Notes, Past Medical Hx, Past Surgical Hx, Social Hx, Allergies, and Family Hx were all reviewed. RECENT VITALS:  BP: 107/62, Temp: 98.1 °F (36.7 °C), Pulse: 91, Respirations: 16, SpO2: 98 %     ED Course          The patient was given the following medications:  Orders Placed This Encounter   Medications    0.9 % sodium chloride infusion    DISCONTD: medroxyPROGESTERone (PROVERA) tablet 10 mg    DISCONTD: medroxyPROGESTERone (PROVERA) tablet 20 mg    sodium chloride flush 0.9 % injection 5-40 mL    sodium chloride flush 0.9 % injection 5-40 mL    0.9 % sodium chloride infusion    OR Linked Order Group     ondansetron (ZOFRAN-ODT) disintegrating tablet 4 mg     ondansetron (ZOFRAN) injection 4 mg    polyethylene glycol (GLYCOLAX) packet 17 g    OR Linked Order Group     acetaminophen (TYLENOL) tablet 650 mg     acetaminophen (TYLENOL) suppository 650 mg    0.9 % sodium chloride infusion    medroxyPROGESTERone (PROVERA) tablet 10 mg       CONSULTS:  IP CONSULT TO OB [address] / [place] / Joshua Bassem is a 40 y.o. female with a history of abnormal uterine bleeding for which she was seen in the ED approximately 3 weeks ago. H&H was 10.7 and 32.8. Gynecology was consulted and she was prescribed a 7-day course of medroxyprogesterone completed and noted some improvement while taking it. Her symptoms recurred however after completing this and she has not yet followed up with gynecology.   States her bleeding was severe yesterday and she began experiencing

## 2023-06-20 NOTE — CONSENT
Informed Consent for Blood Component Transfusion Note    Dr. Garcia Mccord discussed with the patient the rationale for blood component transfusion; its benefits in treating or preventing fatigue, organ damage, or death; and its risk which includes mild transfusion reactions, rare risk of blood borne infection, or more serious but rare reactions. He discussed the alternatives to transfusion, including the risk and consequences of not receiving transfusion. The patient had an opportunity to ask questions and had agreed to proceed with transfusion of blood components.     Electronically signed by Patsy Lundborg, APRN - CNP on 6/19/23 at 10:50 PM EDT

## 2023-06-20 NOTE — CARE COORDINATION
CM met with patient at bedside for initial assessment and CM rounds. Pt is from home [employer] with spouse and son. Pt drove here and plans to drive home at discharge. No CM needs noted.      Yunier Conde RN, BSN, [facility]  Case Management Department  [phone]

## 2023-06-20 NOTE — PROGRESS NOTES
Patient arrived to unit from ED via [facility] [address]. Is alert oriented, calm and cooperative. Blood transfusion infusing. Skin sweep completed. Tele monitor applied. VS obtained. Patient resting quietly in bed. Bed locked and in lowest position. Call light within reach.  Electronically signed by Meghan Chung RN on 6/20/2023 at 3:50 AM

## 2023-06-20 NOTE — PROGRESS NOTES
Progress Note        Date:2023       Room:4313/4313-01  Patient Bryn Latham     YOB: 1985     Age:37 y.o. Chief Complaint   Patient presents with    Vaginal Bleeding     Pt reports bleeding x1 week. Pt reports she is passing clots. Subjective   Interval History Status: improved. Feels much better after PRBC  No lightheadedness  Fatigue + , however improved       Review of Systems   ROS as mentioned above. Medications   Scheduled Meds:    sodium chloride flush  5-40 mL IntraVENous 2 times per day     Continuous Infusions:    sodium chloride      sodium chloride      sodium chloride 100 mL/hr at 23 0305     PRN Meds: sodium chloride, sodium chloride flush, sodium chloride, ondansetron **OR** ondansetron, polyethylene glycol, acetaminophen **OR** acetaminophen    Past History    Past Medical History:   has a past medical history of Schizophrenia ([address] and Seasonal allergies. Social History:   reports that she has never smoked. She has never used smokeless tobacco. She reports that she does not currently use alcohol. She reports that she does not use drugs. Family History: History reviewed. No pertinent family history. Physical Examination      Vitals:  /76   Pulse 80   Temp 98 °F (36.7 °C) (Oral)   Resp 18   Ht 5' 4\" (1.626 m)   Wt 178 lb 5.6 oz (80.9 kg)   LMP  (LMP Unknown) Comment: Pt reports been having vaginal bleeding fo a while she does not remember the last regular one she has had  SpO2 99%   BMI 30.61 kg/m²   Temp (24hrs), Av.2 °F (36.8 °C), Min:98 °F (36.7 °C), Max:98.4 °F (36.9 °C)      I/O (24Hr): Intake/Output Summary (Last 24 hours) at 2023 1236  Last data filed at 2023 8685  Gross per 24 hour   Intake 1221.74 ml   Output --   Net 1221.74 ml       Physical Exam  Constitutional:       General: She is not in acute distress. Appearance: Normal appearance. She is not ill-appearing.    HENT:      Head:

## 2023-06-20 NOTE — CONSULTS
Full note to follow. Ultrasound pending Minimal bleeding. Unable to get birth control pills here. Would continue provera 10mg BID. Will see patient in my office on Friday for workup and long term management. To continue iron. Thanks.  Michael Arriaga MD

## 2023-06-20 NOTE — ED NOTES
ED TO INPATIENT SBAR HANDOFF    Patient Name: Gurwidner Chen   :  1985  40 y.o. MRN:  [id number]  Preferred Name  [facility]  ED Room #:  O38/K94-78  Family/Caregiver Present yes   Restraints no   Sitter no   Sepsis Risk Score Sepsis Risk Score: 1.16    Situation  Code Status: No Order No additional code details. Allergies: Methotrexate  Weight: No data found. Arrived from: home  Chief Complaint:   Chief Complaint   Patient presents with    Vaginal Bleeding     Pt reports bleeding x1 week. Pt reports she is passing clots. Hospital Problem/Diagnosis:  Principal Problem:    Vaginal bleeding  Resolved Problems:    * No resolved hospital problems. *    Imaging:   US PELVIS COMPLETE    (Results Pending)     Abnormal labs:   Abnormal Labs Reviewed   CBC WITH AUTO DIFFERENTIAL - Abnormal; Notable for the following components:       Result Value    RBC 2.59 (*)     Hemoglobin 6.7 (*)     Hematocrit 20.6 (*)     MCV 79.6 (*)     RDW 18.1 (*)     Platelets 638 (*)     All other components within normal limits    Narrative:     Rajwinderrachel Saldivars tel. [id number],  Hematology results called to and read back by [facility], 2023 16:12,  by [facility]     Critical values: yes     Abnormal Assessment Findings: generalized fatigue. Pt was c/o large amounts of vaginal discharge with large clots.      Background  History:   Past Medical History:   Diagnosis Date    Schizophrenia ([facility] [address]     Seasonal allergies        Assessment    Vitals/MEWS: MEWS Score: 1  Level of Consciousness: Alert (0)   Vitals:    23 1455 23 1931 23 1945 23   BP: 103/65 107/69 (!) 115/56    Pulse: 99 81 92    Resp: 18 16 16    Temp: 98.2 °F (36.8 °C) 98.3 °F (36.8 °C) 98.3 °F (36.8 °C)    SpO2: 99% 100% 100%    Height:    5' 4\" (1.626 m)     FiO2 (%):   O2 Flow Rate:      Cardiac Rhythm:    Pain Assessment: 0 [] Verbal [] Oanh Hiren Scale  Pain Scale: Pain Assessment  Pain Assessment: None - Denies Pain  Pain Level:

## 2023-06-21 VITALS
OXYGEN SATURATION: 98 % | DIASTOLIC BLOOD PRESSURE: 74 MMHG | BODY MASS INDEX: 30.45 KG/M2 | SYSTOLIC BLOOD PRESSURE: 107 MMHG | RESPIRATION RATE: 16 BRPM | WEIGHT: 178.35 LBS | HEIGHT: 64 IN | HEART RATE: 85 BPM | TEMPERATURE: 98 F

## 2023-06-21 LAB
DEPRECATED RDW RBC AUTO: 18 % (ref 12.4–15.4)
FERRITIN SERPL IA-MCNC: 11.9 NG/ML (ref 15–150)
HCT VFR BLD AUTO: 26.5 % (ref 36–48)
HGB BLD-MCNC: 8.7 G/DL (ref 12–16)
IRON SATN MFR SERPL: 7 % (ref 15–50)
IRON SERPL-MCNC: 25 UG/DL (ref 37–145)
MCH RBC QN AUTO: 26.5 PG (ref 26–34)
MCHC RBC AUTO-ENTMCNC: 32.9 G/DL (ref 31–36)
MCV RBC AUTO: 80.6 FL (ref 80–100)
PLATELET # BLD AUTO: 362 K/UL (ref 135–450)
PMV BLD AUTO: 7.7 FL (ref 5–10.5)
RBC # BLD AUTO: 3.29 M/UL (ref 4–5.2)
TIBC SERPL-MCNC: 354 UG/DL (ref 260–445)
WBC # BLD AUTO: 6.1 K/UL (ref 4–11)

## 2023-06-21 PROCEDURE — 30233N1 TRANSFUSION OF NONAUTOLOGOUS RED BLOOD CELLS INTO PERIPHERAL VEIN, PERCUTANEOUS APPROACH: ICD-10-PCS | Performed by: INTERNAL MEDICINE

## 2023-06-21 PROCEDURE — 36415 COLL VENOUS BLD VENIPUNCTURE: CPT

## 2023-06-21 PROCEDURE — 82728 ASSAY OF FERRITIN: CPT

## 2023-06-21 PROCEDURE — 83550 IRON BINDING TEST: CPT

## 2023-06-21 PROCEDURE — 83540 ASSAY OF IRON: CPT

## 2023-06-21 PROCEDURE — 6360000002 HC RX W HCPCS: Performed by: INTERNAL MEDICINE

## 2023-06-21 PROCEDURE — 85027 COMPLETE CBC AUTOMATED: CPT

## 2023-06-21 PROCEDURE — 2580000003 HC RX 258: Performed by: ANESTHESIOLOGY

## 2023-06-21 PROCEDURE — 2580000003 HC RX 258: Performed by: INTERNAL MEDICINE

## 2023-06-21 RX ORDER — MEDROXYPROGESTERONE ACETATE 10 MG/1
10 TABLET ORAL 2 TIMES DAILY
Qty: 60 TABLET | Refills: 0 | Status: SHIPPED | OUTPATIENT
Start: 2023-06-21 | End: 2023-07-21

## 2023-06-21 RX ADMIN — IRON SUCROSE 200 MG: 20 INJECTION, SOLUTION INTRAVENOUS at 12:24

## 2023-06-21 RX ADMIN — SODIUM CHLORIDE 25 ML: 9 INJECTION, SOLUTION INTRAVENOUS at 12:23

## 2023-06-21 RX ADMIN — SODIUM CHLORIDE, PRESERVATIVE FREE 10 ML: 5 INJECTION INTRAVENOUS at 08:00

## 2023-06-21 ASSESSMENT — PAIN SCALES - GENERAL: PAINLEVEL_OUTOF10: 0

## 2023-06-21 NOTE — PROGRESS NOTES
Discharge instructions reviewed with pt/family, discussed medications, VU, denies any further questions or anxiety related to discharge. IV/tele discontinued. Pt discharged to home with spouse. Taken from room via 4450 personal belongings taken with. New medications supplied through [employer].

## 2023-06-21 NOTE — DISCHARGE SUMMARY
Discharge Summary    Date:6/21/2023        Patient Guera Elizalde     YOB: 1985     Age:37 y.o. Admit Date:6/19/2023   Admission Condition:fair   Discharged Condition:good  Discharge Date: 06/21/23    Discharge Diagnoses   Principal Problem:    Vaginal bleeding  Resolved Problems:    * No resolved hospital problems. [facility] Stay   Narrative of Hospital Course:        Acute on chronic blood loss anemia  She has Menorrhagia  5-6 days of menstrual cycle. C/o cramps few days prior to menstrual cycle. Suspect acute on chronic anemia  PRBC transfusion, Hgb improved 8.7 today   Venofer before dc      Menorrhagia  Gyn consulted  Preg negative  US pelvis ordered showed complex ovarian cyst.  Discussed follow up with Gyn. S/p Provera per Gyn recs. Dc on provera per Gyn recs. Physical exam   Vitals:    06/20/23 2338 06/21/23 0418 06/21/23 0756 06/21/23 1200   BP: 108/60 109/70 103/65 107/74   Pulse: 85 75 80 85   Resp: 16 18 16 16   Temp: 98.5 °F (36.9 °C) 98.1 °F (36.7 °C) 98.2 °F (36.8 °C) 98 °F (36.7 °C)   TempSrc: Oral Oral Oral Oral   SpO2: 96% 98% 97% 98%   Weight:       Height:             Physical Exam  Constitutional:       Appearance: Normal appearance. HENT:      Head: Normocephalic and atraumatic. Cardiovascular:      Rate and Rhythm: Normal rate and regular rhythm. Pulses: Normal pulses. Heart sounds: Normal heart sounds. Pulmonary:      Effort: Pulmonary effort is normal.      Breath sounds: Normal breath sounds. Abdominal:      General: Abdomen is flat. Palpations: Abdomen is soft. Skin:     General: Skin is warm and dry. Capillary Refill: Capillary refill takes less than 2 seconds. Neurological:      General: No focal deficit present. Mental Status: She is alert and oriented to person, place, and time.    Psychiatric:         Mood and Affect: Mood normal.         Behavior: Behavior normal.         Consultants:   IP CONSULT TO OB

## 2023-06-21 NOTE — PLAN OF CARE
Problem: Hematologic - Adult  Goal: Maintains hematologic stability  6/21/2023 0322 by Darwin Guerra RN  Outcome: Progressing  Flowsheets (Taken 6/20/2023 0454)  Maintains hematologic stability:   Assess for signs and symptoms of bleeding or hemorrhage   Monitor labs for bleeding or clotting disorders   Administer blood products/factors as ordered  Note: S/s of bleeding monitored      Problem: Discharge Planning  Goal: Discharge to home or other facility with appropriate resources  6/21/2023 0322 by Darwin Guerra RN  Outcome: Progressing  Flowsheets (Taken 6/20/2023 0454)  Discharge to home or other facility with appropriate resources:   Identify barriers to discharge with patient and caregiver   Arrange for needed discharge resources and transportation as appropriate   Identify discharge learning needs (meds, wound care, etc)   Refer to discharge planning if patient needs post-hospital services based on physician order or complex needs related to functional status, cognitive ability or social support system     Problem: Metabolic/Fluid and Electrolytes - Adult  Goal: Hemodynamic stability and optimal renal function maintained  6/21/2023 0322 by Darwin Guerra RN  Outcome: Progressing  Flowsheets (Taken 6/20/2023 0454)  Hemodynamic stability and optimal renal function maintained:   Monitor labs and assess for signs and symptoms of volume excess or deficit   Monitor intake, output and patient weight   Monitor urine specific gravity, serum osmolarity and serum sodium as indicated or ordered   Monitor response to interventions for patient's volume status, including labs, urine output, blood pressure (other measures as available)   Encourage oral intake as appropriate  Note: Continuous fluids providing pressure support.
Problem: Hematologic - Adult  Goal: Maintains hematologic stability  Outcome: Progressing  Flowsheets (Taken 6/20/2023 0454)  Maintains hematologic stability:   Assess for signs and symptoms of bleeding or hemorrhage   Monitor labs for bleeding or clotting disorders   Administer blood products/factors as ordered  Note: Blood transfusion completed. Tolerated well.       Problem: Discharge Planning  Goal: Discharge to home or other facility with appropriate resources  Outcome: Progressing  Flowsheets (Taken 6/20/2023 0454)  Discharge to home or other facility with appropriate resources:   Identify barriers to discharge with patient and caregiver   Arrange for needed discharge resources and transportation as appropriate   Identify discharge learning needs (meds, wound care, etc)   Refer to discharge planning if patient needs post-hospital services based on physician order or complex needs related to functional status, cognitive ability or social support system     Problem: Metabolic/Fluid and Electrolytes - Adult  Goal: Hemodynamic stability and optimal renal function maintained  Outcome: Progressing  Flowsheets (Taken 6/20/2023 0454)  Hemodynamic stability and optimal renal function maintained:   Monitor labs and assess for signs and symptoms of volume excess or deficit   Monitor intake, output and patient weight   Monitor urine specific gravity, serum osmolarity and serum sodium as indicated or ordered   Monitor response to interventions for patient's volume status, including labs, urine output, blood pressure (other measures as available)   Encourage oral intake as appropriate
Monitor response to electrolyte replacements, including repeat lab results as appropriate  Goal: Hemodynamic stability and optimal renal function maintained  Outcome: Progressing  Flowsheets (Taken 6/20/2023 0454 by Mirna Vallejo RN)  Hemodynamic stability and optimal renal function maintained:   Monitor labs and assess for signs and symptoms of volume excess or deficit   Monitor intake, output and patient weight   Monitor urine specific gravity, serum osmolarity and serum sodium as indicated or ordered   Monitor response to interventions for patient's volume status, including labs, urine output, blood pressure (other measures as available)   Encourage oral intake as appropriate     Problem: Pain  Goal: Verbalizes/displays adequate comfort level or baseline comfort level  Outcome: Progressing  Flowsheets (Taken 6/20/2023 2059)  Verbalizes/displays adequate comfort level or baseline comfort level:   Encourage patient to monitor pain and request assistance   Assess pain using appropriate pain scale   Administer analgesics based on type and severity of pain and evaluate response  Note: Prn Tylenol was administered for headache this morning, medication was effective.      Problem: Safety - Adult  Goal: Free from fall injury  Outcome: Progressing     Problem: ABCDS Injury Assessment  Goal: Absence of physical injury  Outcome: Progressing  Flowsheets (Taken 6/20/2023 2059)  Absence of Physical Injury: Implement safety measures based on patient assessment

## 2023-06-21 NOTE — CONSULTS
[address]                [address]                                  CONSULTATION    PATIENT NAME: Mario Martinez                    :        1985  MED REC NO:   [id number]                          ROOM:       4313  ACCOUNT NO:   [de-identified]                           ADMIT DATE: 2023  PROVIDER:     Becky Nesbitt MD    CONSULT DATE:  2023    HISTORY OF PRESENT ILLNESS:  This is a 37-?year-?old patient who  presented several weeks ago to [facility] for vaginal bleeding. At  that time, she was given medication   which she took for a while and then stopped. The bleeding had  increased, and the patient came back to the emergency room with  continual bleeding and drop of four points in her hemoglobin. She was  symptomatic. At this point, she was admitted and transfused with blood,  now feels better. She was restarted on her Provera medication with a  markedly diminished blood flow. Pelvic ultrasounds were obtained,  results were too not yet available at the time of discharge. She has  one living child and is currently not using any birth control. Instructions were given to the patient to continue to take her iron  pills and to continue to take the Provera. She was given a card and  told to call the office to be seen in two days where a complete workup  will be performed including probable endometrial biopsy which can be  done in an office setting. Total length of time for the patient was 20  minutes.         Damion Sarabia MD    D: 2023 16:33:01       T: 2023 18:41:18     [email or web address]  Job#: [id number]     Doc#: [id number]    CC:

## 2024-02-21 NOTE — ED PROVIDER NOTES
there is no significant fullness. Musculoskeletal: Normal range of motion. Neurological: She is alert and oriented to person, place, and time. Skin: Skin is warm and dry. She is not diaphoretic. No pallor. Psychiatric: She has a normal mood and affect. Her behavior is normal.   Nursing note and vitals reviewed. MDM    See physician assistant note. Patient has no acute significant abnormalities on workup today. She felt comfortable at the end of treatments. Patient Referrals:  No follow-up provider specified. Discharge Medications:  Discharge Medication List as of 10/18/2018  3:17 PM      START taking these medications    Details   sulfamethoxazole-trimethoprim (BACTRIM DS) 800-160 MG per tablet Take 1 tablet by mouth 2 times daily for 3 days, Disp-6 tablet, R-0Print      naproxen (NAPROSYN) 500 MG tablet Take 1 tablet by mouth 2 times daily for 20 doses, Disp-20 tablet, R-0Print      ondansetron (ZOFRAN ODT) 4 MG disintegrating tablet Take 1-2 tablets by mouth every 12 hours as needed for Nausea May Sub regular tablet (non-ODT) if insurance does not cover ODT., Disp-12 tablet, R-0Print      HYDROcodone-acetaminophen (NORCO) 5-325 MG per tablet Take 1 tablet by mouth every 6 hours as needed for Pain for up to 3 days. Sedation precautions please., Disp-10 tablet, R-0Print             FINAL IMPRESSION  1. Right ovarian cyst    2. Urinary tract infection without hematuria, site unspecified        Blood pressure (!) 111/57, pulse 70, temperature 97.8 °F (36.6 °C), resp. rate 15, weight 130 lb (59 kg), SpO2 99 %, unknown if currently breastfeeding. For further details of [facility] emergency department encounter, please see documentation by advanced practice provider.         Brenda Gallego III, DO  11/13/18 0048
no fullness. Left adnexum displays no mass, no tenderness and no fullness. No vaginal discharge found. Genitourinary Comments: Vaginal exam performed sitting propped up on a bed pan, nurse at bedside. There is no cervical motion tenderness, cervical os is closed. No patient does have tenderness to the right adnexa but there is no significant fullness. Musculoskeletal: Normal range of motion. Neurological: She is alert and oriented to person, place, and time. Skin: Skin is warm and dry. She is not diaphoretic. No pallor. Psychiatric: She has a normal mood and affect. Her behavior is normal.   Nursing note and vitals reviewed. PAST MEDICAL HISTORY     Past Medical History:   Diagnosis Date    Seasonal allergies      SURGICAL HISTORY       Past Surgical History:   Procedure Laterality Date    DILATION AND CURETTAGE OF UTERUS      INDUCED        CURRENT MEDICATIONS       Previous Medications    BUTALBITAL-APAP-CAFFEINE (FIORICET) -40 MG CAPS PER CAPSULE    Take 1 capsule by mouth every 4 hours as needed for Headaches    FAMOTIDINE (PEPCID) 20 MG TABLET    Take 1 tablet by mouth daily for 10 doses    LORATADINE (CLARITIN) 10 MG TABLET    Take 10 mg by mouth daily. ALLERGIES     Other    FAMILY HISTORY     History reviewed. No pertinent family history.        SOCIAL HISTORY       Social History     Social History    Marital status: Single     Spouse name: N/A    Number of children: N/A    Years of education: N/A     Social History Main Topics    Smoking status: Never Smoker    Smokeless tobacco: None    Alcohol use Yes      Comment: occ    Drug use: No    Sexual activity: Not Asked     Other Topics Concern    None     Social History Narrative    None       SCREENINGS             DIAGNOSTIC RESULTS   LABS:    Labs Reviewed   URINALYSIS - Abnormal; Notable for the following:        Result Value    Clarity, UA CLOUDY (*)     Leukocyte Esterase, Urine MODERATE (*)     All
normal

## 2024-03-01 ENCOUNTER — HOSPITAL ENCOUNTER (EMERGENCY)
Age: 39
Discharge: HOME | End: 2024-03-01

## 2024-03-01 VITALS
TEMPERATURE: 98 F | HEART RATE: 71 BPM | OXYGEN SATURATION: 98 % | RESPIRATION RATE: 18 BRPM | SYSTOLIC BLOOD PRESSURE: 125 MMHG | DIASTOLIC BLOOD PRESSURE: 85 MMHG | HEIGHT: 64 IN | BODY MASS INDEX: 30.61 KG/M2

## 2024-03-01 ASSESSMENT — LIFESTYLE VARIABLES
HOW MANY STANDARD DRINKS CONTAINING ALCOHOL DO YOU HAVE ON A TYPICAL DAY: PATIENT DOES NOT DRINK
HOW OFTEN DO YOU HAVE A DRINK CONTAINING ALCOHOL: NEVER

## 2024-03-02 ENCOUNTER — HOSPITAL ENCOUNTER (EMERGENCY)
Age: 39
Discharge: HOME | End: 2024-03-02
Attending: STUDENT IN AN ORGANIZED HEALTH CARE EDUCATION/TRAINING PROGRAM
Payer: COMMERCIAL

## 2024-03-02 VITALS
SYSTOLIC BLOOD PRESSURE: 118 MMHG | HEIGHT: 64 IN | BODY MASS INDEX: 32.37 KG/M2 | OXYGEN SATURATION: 98 % | TEMPERATURE: 98 F | RESPIRATION RATE: 16 BRPM | DIASTOLIC BLOOD PRESSURE: 90 MMHG | WEIGHT: 189.6 LBS | HEART RATE: 66 BPM

## 2024-03-02 DIAGNOSIS — R53.83 FATIGUE, UNSPECIFIED TYPE: Primary | ICD-10-CM

## 2024-03-02 LAB
ABO + RH BLD: NORMAL
ANION GAP SERPL CALCULATED.3IONS-SCNC: 11 MMOL/L (ref 3–16)
BACTERIA GENITAL QL WET PREP: NORMAL
BASOPHILS # BLD: 0 K/UL (ref 0–0.2)
BASOPHILS NFR BLD: 0.4 %
BILIRUB UR QL STRIP.AUTO: NEGATIVE
BLD GP AB SCN SERPL QL: NORMAL
BUN SERPL-MCNC: 10 MG/DL (ref 7–20)
CALCIUM SERPL-MCNC: 9.1 MG/DL (ref 8.3–10.6)
CHLORIDE SERPL-SCNC: 102 MMOL/L (ref 99–110)
CLARITY UR: CLEAR
CLUE CELLS SPEC QL WET PREP: NORMAL
CO2 SERPL-SCNC: 24 MMOL/L (ref 21–32)
COLOR UR: YELLOW
CREAT SERPL-MCNC: 0.7 MG/DL (ref 0.6–1.1)
DEPRECATED RDW RBC AUTO: 15.6 % (ref 12.4–15.4)
EOSINOPHIL # BLD: 0.1 K/UL (ref 0–0.6)
EOSINOPHIL NFR BLD: 1 %
EPI CELLS SPEC QL WET PREP: NORMAL
GFR SERPLBLD CREATININE-BSD FMLA CKD-EPI: >60 ML/MIN/{1.73_M2}
GLUCOSE SERPL-MCNC: 91 MG/DL (ref 70–99)
GLUCOSE UR STRIP.AUTO-MCNC: NEGATIVE MG/DL
HCG UR QL: NEGATIVE
HCT VFR BLD AUTO: 37.1 % (ref 36–48)
HGB BLD-MCNC: 12.5 G/DL (ref 12–16)
HGB UR QL STRIP.AUTO: NEGATIVE
KETONES UR STRIP.AUTO-MCNC: NEGATIVE MG/DL
LEUKOCYTE ESTERASE UR QL STRIP.AUTO: NEGATIVE
LYMPHOCYTES # BLD: 1.8 K/UL (ref 1–5.1)
LYMPHOCYTES NFR BLD: 23.7 %
MCH RBC QN AUTO: 27.9 PG (ref 26–34)
MCHC RBC AUTO-ENTMCNC: 33.6 G/DL (ref 31–36)
MCV RBC AUTO: 83 FL (ref 80–100)
MONOCYTES # BLD: 0.5 K/UL (ref 0–1.3)
MONOCYTES NFR BLD: 6.3 %
NEUTROPHILS # BLD: 5.3 K/UL (ref 1.7–7.7)
NEUTROPHILS NFR BLD: 68.6 %
NITRITE UR QL STRIP.AUTO: NEGATIVE
PH UR STRIP.AUTO: 6 [PH] (ref 5–8)
PLATELET # BLD AUTO: 334 K/UL (ref 135–450)
PMV BLD AUTO: 8 FL (ref 5–10.5)
POTASSIUM SERPL-SCNC: 3.9 MMOL/L (ref 3.5–5.1)
PROT UR STRIP.AUTO-MCNC: NEGATIVE MG/DL
RBC # BLD AUTO: 4.47 M/UL (ref 4–5.2)
RBC SPEC QL WET PREP: NORMAL
SODIUM SERPL-SCNC: 137 MMOL/L (ref 136–145)
SP GR UR STRIP.AUTO: >=1.03 (ref 1–1.03)
SPECIMEN SOURCE FLD: NORMAL
T VAGINALIS GENITAL QL WET PREP: NORMAL
UA COMPLETE W REFLEX CULTURE PNL UR: NORMAL
UA DIPSTICK W REFLEX MICRO PNL UR: NORMAL
URN SPEC COLLECT METH UR: NORMAL
UROBILINOGEN UR STRIP-ACNC: 0.2 E.U./DL
WBC # BLD AUTO: 7.8 K/UL (ref 4–11)
WBC SPEC QL WET PREP: NORMAL
YEAST GENITAL QL WET PREP: NORMAL

## 2024-03-02 PROCEDURE — 87591 N.GONORRHOEAE DNA AMP PROB: CPT

## 2024-03-02 PROCEDURE — 84703 CHORIONIC GONADOTROPIN ASSAY: CPT

## 2024-03-02 PROCEDURE — 85025 COMPLETE CBC W/AUTO DIFF WBC: CPT

## 2024-03-02 PROCEDURE — 87491 CHLMYD TRACH DNA AMP PROBE: CPT

## 2024-03-02 PROCEDURE — 2580000003 HC RX 258: Performed by: STUDENT IN AN ORGANIZED HEALTH CARE EDUCATION/TRAINING PROGRAM

## 2024-03-02 PROCEDURE — 86901 BLOOD TYPING SEROLOGIC RH(D): CPT

## 2024-03-02 PROCEDURE — 87210 SMEAR WET MOUNT SALINE/INK: CPT

## 2024-03-02 PROCEDURE — 96361 HYDRATE IV INFUSION ADD-ON: CPT

## 2024-03-02 PROCEDURE — 86850 RBC ANTIBODY SCREEN: CPT

## 2024-03-02 PROCEDURE — 99284 EMERGENCY DEPT VISIT MOD MDM: CPT

## 2024-03-02 PROCEDURE — 80048 BASIC METABOLIC PNL TOTAL CA: CPT

## 2024-03-02 PROCEDURE — 86900 BLOOD TYPING SEROLOGIC ABO: CPT

## 2024-03-02 PROCEDURE — 81003 URINALYSIS AUTO W/O SCOPE: CPT

## 2024-03-02 PROCEDURE — 96360 HYDRATION IV INFUSION INIT: CPT

## 2024-03-02 RX ORDER — 0.9 % SODIUM CHLORIDE 0.9 %
1000 INTRAVENOUS SOLUTION INTRAVENOUS ONCE
Status: COMPLETED | OUTPATIENT
Start: 2024-03-02 | End: 2024-03-02

## 2024-03-02 RX ADMIN — SODIUM CHLORIDE 1000 ML: 9 INJECTION, SOLUTION INTRAVENOUS at 18:15

## 2024-03-02 NOTE — ED PROVIDER NOTES
THE [facility]  EMERGENCY DEPARTMENT ENCOUNTER          ATTENDING PHYSICIAN NOTE       Date of evaluation: 3/2/2024    Chief Complaint     Dizziness (Pt presents w/ what she can explain as dintermittent dizziness/weakness-vocalizes she is having difficulty explaining exactly what is going on. \"I feel faint sometimes\". ) and Abnormal Menses (Spotting)      History of Present Illness     Elvira Corbett is a 38 y.o. female who presents with several weeks of fatigue.  The patient has a hard time explaining exactly what her symptoms are but she just states that she does not feel quite right.  She denies any pain or shortness of breath or fevers or chills.  She does have a history of iron deficiency anemia in the past and has required blood transfusions but has not had her hemoglobin checked recently.  She otherwise denies any specific symptoms.    ASSESSMENT / PLAN  (MEDICAL DECISION MAKING)     INITIAL VITALS: BP: 114/76, Temp: 98 °F (36.7 °C), Pulse: 77, Respirations: 16, SpO2: 97 %      Elvira Corbett is a 38 y.o. female with past medical history of iron deficiency anemia presenting with vaginal spotting, weakness/fatigue, and vaginal discharge.    The symptoms have been going on for several weeks and she has a hard time localizing any specific symptoms aside from her vaginal discharge.  She is sexually active with 1 partner but is insistent that it is impossible that she could be pregnant.  She also has very low suspicion for STIs, but is willing to be tested.    Workup today is remarkable for a CBC without leukocytosis or anemia, BMP without electrolyte abnormality, negative UPT, UA without signs of infection, and a normal wet prep.  Patient was given 1 L of fluid and reassess.  On reassessment, patient does endorse significant improvement in her symptoms.  After long discussion, the patient states that she has been sleeping less because of work as well as drinking less fluids.  I suspect her fatigue is in

## 2024-03-04 LAB
C TRACH DNA CVX QL NAA+PROBE: NEGATIVE
N GONORRHOEA DNA CERV MUCUS QL NAA+PROBE: NEGATIVE

## 2024-07-17 ENCOUNTER — HOSPITAL ENCOUNTER (EMERGENCY)
Age: 39
Discharge: HOME | End: 2024-07-18
Attending: EMERGENCY MEDICINE
Payer: MEDICAID

## 2024-07-17 VITALS
OXYGEN SATURATION: 99 % | DIASTOLIC BLOOD PRESSURE: 71 MMHG | HEART RATE: 75 BPM | RESPIRATION RATE: 16 BRPM | HEIGHT: 64 IN | TEMPERATURE: 98.2 F | SYSTOLIC BLOOD PRESSURE: 116 MMHG | BODY MASS INDEX: 32.54 KG/M2

## 2024-07-17 DIAGNOSIS — N93.9 VAGINAL BLEEDING: Primary | ICD-10-CM

## 2024-07-17 DIAGNOSIS — B96.89 BACTERIAL VAGINOSIS: ICD-10-CM

## 2024-07-17 DIAGNOSIS — N76.0 BACTERIAL VAGINOSIS: ICD-10-CM

## 2024-07-17 LAB
ABO + RH BLD: NORMAL
ALBUMIN SERPL-MCNC: 3.8 G/DL (ref 3.4–5)
ALP SERPL-CCNC: 66 U/L (ref 40–129)
ALT SERPL-CCNC: 7 U/L (ref 10–40)
ANION GAP SERPL CALCULATED.3IONS-SCNC: 12 MMOL/L (ref 3–16)
APTT BLD: 30.2 SEC (ref 22.1–36.4)
AST SERPL-CCNC: 17 U/L (ref 15–37)
BASOPHILS # BLD: 0.1 K/UL (ref 0–0.2)
BASOPHILS NFR BLD: 0.8 %
BILIRUB DIRECT SERPL-MCNC: <0.2 MG/DL (ref 0–0.3)
BILIRUB INDIRECT SERPL-MCNC: ABNORMAL MG/DL (ref 0–1)
BILIRUB SERPL-MCNC: <0.2 MG/DL (ref 0–1)
BLD GP AB SCN SERPL QL: NORMAL
BUN SERPL-MCNC: 10 MG/DL (ref 7–20)
CALCIUM SERPL-MCNC: 8.6 MG/DL (ref 8.3–10.6)
CHLORIDE SERPL-SCNC: 103 MMOL/L (ref 99–110)
CO2 SERPL-SCNC: 21 MMOL/L (ref 21–32)
CREAT SERPL-MCNC: 0.7 MG/DL (ref 0.6–1.1)
DEPRECATED RDW RBC AUTO: 18.1 % (ref 12.4–15.4)
EOSINOPHIL # BLD: 0.1 K/UL (ref 0–0.6)
EOSINOPHIL NFR BLD: 0.7 %
GFR SERPLBLD CREATININE-BSD FMLA CKD-EPI: >90 ML/MIN/{1.73_M2}
GLUCOSE SERPL-MCNC: 98 MG/DL (ref 70–99)
HCG SERPL QL: NEGATIVE
HCT VFR BLD AUTO: 23 % (ref 36–48)
HGB BLD-MCNC: 7.2 G/DL (ref 12–16)
INR PPP: 0.93 (ref 0.85–1.15)
LYMPHOCYTES # BLD: 1.9 K/UL (ref 1–5.1)
LYMPHOCYTES NFR BLD: 27.5 %
MCH RBC QN AUTO: 21.8 PG (ref 26–34)
MCHC RBC AUTO-ENTMCNC: 31.2 G/DL (ref 31–36)
MCV RBC AUTO: 69.8 FL (ref 80–100)
MONOCYTES # BLD: 0.5 K/UL (ref 0–1.3)
MONOCYTES NFR BLD: 7 %
NEUTROPHILS # BLD: 4.4 K/UL (ref 1.7–7.7)
NEUTROPHILS NFR BLD: 64 %
PLATELET # BLD AUTO: 426 K/UL (ref 135–450)
PMV BLD AUTO: 8.1 FL (ref 5–10.5)
POTASSIUM SERPL-SCNC: 3.7 MMOL/L (ref 3.5–5.1)
PROT SERPL-MCNC: 7.3 G/DL (ref 6.4–8.2)
PROTHROMBIN TIME: 12.7 SEC (ref 11.9–14.9)
RBC # BLD AUTO: 3.3 M/UL (ref 4–5.2)
SODIUM SERPL-SCNC: 136 MMOL/L (ref 136–145)
WBC # BLD AUTO: 6.9 K/UL (ref 4–11)

## 2024-07-17 PROCEDURE — 85025 COMPLETE CBC W/AUTO DIFF WBC: CPT

## 2024-07-17 PROCEDURE — 36415 COLL VENOUS BLD VENIPUNCTURE: CPT

## 2024-07-17 PROCEDURE — 87210 SMEAR WET MOUNT SALINE/INK: CPT

## 2024-07-17 PROCEDURE — 99284 EMERGENCY DEPT VISIT MOD MDM: CPT

## 2024-07-17 PROCEDURE — 85730 THROMBOPLASTIN TIME PARTIAL: CPT

## 2024-07-17 PROCEDURE — 86901 BLOOD TYPING SEROLOGIC RH(D): CPT

## 2024-07-17 PROCEDURE — 81001 URINALYSIS AUTO W/SCOPE: CPT

## 2024-07-17 PROCEDURE — 84703 CHORIONIC GONADOTROPIN ASSAY: CPT

## 2024-07-17 PROCEDURE — 80048 BASIC METABOLIC PNL TOTAL CA: CPT

## 2024-07-17 PROCEDURE — 87591 N.GONORRHOEAE DNA AMP PROB: CPT

## 2024-07-17 PROCEDURE — 85610 PROTHROMBIN TIME: CPT

## 2024-07-17 PROCEDURE — 87491 CHLMYD TRACH DNA AMP PROBE: CPT

## 2024-07-17 PROCEDURE — 86850 RBC ANTIBODY SCREEN: CPT

## 2024-07-17 PROCEDURE — 86900 BLOOD TYPING SEROLOGIC ABO: CPT

## 2024-07-17 PROCEDURE — 80076 HEPATIC FUNCTION PANEL: CPT

## 2024-07-17 RX ORDER — MEDROXYPROGESTERONE ACETATE 10 MG/1
10 TABLET ORAL ONCE
Status: COMPLETED | OUTPATIENT
Start: 2024-07-18 | End: 2024-07-18

## 2024-07-17 ASSESSMENT — PAIN - FUNCTIONAL ASSESSMENT: PAIN_FUNCTIONAL_ASSESSMENT: NONE - DENIES PAIN

## 2024-07-18 LAB
AMORPH SED URNS QL MICRO: NORMAL /HPF
BACTERIA GENITAL QL WET PREP: ABNORMAL
BILIRUB UR QL STRIP.AUTO: NEGATIVE
CLARITY UR: CLEAR
CLUE CELLS SPEC QL WET PREP: ABNORMAL
COLOR UR: YELLOW
EPI CELLS #/AREA URNS HPF: NORMAL /HPF (ref 0–5)
EPI CELLS SPEC QL WET PREP: ABNORMAL
GLUCOSE UR STRIP.AUTO-MCNC: NEGATIVE MG/DL
HGB UR QL STRIP.AUTO: ABNORMAL
KETONES UR STRIP.AUTO-MCNC: NEGATIVE MG/DL
LEUKOCYTE ESTERASE UR QL STRIP.AUTO: NEGATIVE
NITRITE UR QL STRIP.AUTO: NEGATIVE
PH UR STRIP.AUTO: 6 [PH] (ref 5–8)
PROT UR STRIP.AUTO-MCNC: ABNORMAL MG/DL
RBC #/AREA URNS HPF: NORMAL /HPF (ref 0–4)
RBC SPEC QL WET PREP: ABNORMAL
SP GR UR STRIP.AUTO: >=1.03 (ref 1–1.03)
SPECIMEN SOURCE FLD: ABNORMAL
T VAGINALIS GENITAL QL WET PREP: ABNORMAL
UA COMPLETE W REFLEX CULTURE PNL UR: ABNORMAL
UA DIPSTICK W REFLEX MICRO PNL UR: YES
URN SPEC COLLECT METH UR: ABNORMAL
UROBILINOGEN UR STRIP-ACNC: 0.2 E.U./DL
WBC #/AREA URNS HPF: NORMAL /HPF (ref 0–5)
WBC SPEC QL WET PREP: ABNORMAL
YEAST GENITAL QL WET PREP: ABNORMAL

## 2024-07-18 PROCEDURE — 6370000000 HC RX 637 (ALT 250 FOR IP)

## 2024-07-18 RX ORDER — MEDROXYPROGESTERONE ACETATE 10 MG/1
10 TABLET ORAL DAILY
Qty: 30 TABLET | Refills: 0 | Status: SHIPPED | OUTPATIENT
Start: 2024-07-18 | End: 2024-08-17

## 2024-07-18 RX ORDER — METRONIDAZOLE 500 MG/1
500 TABLET ORAL 2 TIMES DAILY
Qty: 14 TABLET | Refills: 0 | Status: ON HOLD | OUTPATIENT
Start: 2024-07-18 | End: 2024-07-22

## 2024-07-18 RX ORDER — FERROUS SULFATE 325(65) MG
325 TABLET ORAL 2 TIMES DAILY
Qty: 60 TABLET | Refills: 0 | Status: ON HOLD | OUTPATIENT
Start: 2024-07-18 | End: 2024-07-22

## 2024-07-18 RX ADMIN — MEDROXYPROGESTERONE ACETATE 10 MG: 10 TABLET ORAL at 00:39

## 2024-07-18 ASSESSMENT — ENCOUNTER SYMPTOMS
ABDOMINAL PAIN: 0
SHORTNESS OF BREATH: 0
CONSTIPATION: 0
EYE DISCHARGE: 0
COUGH: 0
SORE THROAT: 0
EYE ITCHING: 0
WHEEZING: 0
DIARRHEA: 0
NAUSEA: 0
EYE PAIN: 0
RHINORRHEA: 0
BACK PAIN: 0

## 2024-07-18 NOTE — ED NOTES
Reviewed discharge instructions, medication reconciliation, and patient education information with patient. Patient stated understanding, and answered questions to satisfaction. Patient verbalized satisfaction of care. PIV removed at this time. Patient ambulated safely to exit with a steady gait in no obvious acute distress. Patient verbalized understanding of returning to ER if symptoms worsen, and to follow up with primary health care provider.   .     Susan White RN  07/18/24 0122

## 2024-07-18 NOTE — ED PROVIDER NOTES
THE [facility]  EMERGENCY DEPARTMENT ENCOUNTER          PHYSICIAN ASSISTANT NOTE       Date of evaluation: 7/17/2024    Chief Complaint     Vaginal Bleeding (Pt states she feels she has been on period \"consistently\" for a long time. States lately bleeding has increased and passing more clots. States she feels this episode has been going on for at least two months but has increased in the last 3 days. Pt states she is now feeling lightheaded and has a headache. Pt states she did have this previously and did require a blood transfusion. )      History of Present Illness     Elvira Corbett is a 38 y.o. female who presents to the emergency department with chief complaint of vaginal bleeding.  The patient states that for as long as she can remember she has been constantly bleeding.  She states that she goes no more than a couple of days without bleeding.  She states that over the past few days that has been increasingly heavy.  She states that she has been passing clots.  She estimates that she is using 3-4 pads in a day..  She states that she has also been feeling lightheaded and fatigued.  She denies any abdominal pain.  She has been hospitalized for this in the past.  On chart review, she was admitted on 6/23.  She had a pelvic ultrasound which revealed a ovarian cyst.  She was seen by OB/GYN and was started on Provera while inpatient and had resolution of her bleeding.  The patient states that she did not continue to take Provera on discharge as she had resolution of the bleeding she did not think that she needed it    Review of Systems     Review of Systems   Constitutional:  Negative for chills, fatigue and fever.   HENT:  Negative for congestion, rhinorrhea and sore throat.    Eyes:  Negative for pain, discharge and itching.   Respiratory:  Negative for cough, shortness of breath and wheezing.    Cardiovascular:  Negative for chest pain, palpitations and leg swelling.   Gastrointestinal:  Negative for

## 2024-07-18 NOTE — ED PROVIDER NOTES
ED Attending Attestation Note     Date of evaluation: 7/17/2024    This patient was seen by the advance practice provider.  I have seen and examined the patient, agree with the workup, evaluation, management and diagnosis. The care plan has been discussed.  My assessment reveals complaints of vaginal bleeding.  Patient reports she has had issues with heavy vaginal bleeding for many months, at 1 point needing blood transfusion because of it, but states the bleeding has been worse over the last couple of days.  On arrival, patient is hemodynamically stable.  She has a soft abdomen on exam.  Will check laboratory studies, gynecologic exam.       Karson Armstrong MD  07/18/24 0105

## 2024-07-18 NOTE — DISCHARGE INSTRUCTIONS
Elvira, you were seen in the emergency department for vaginal bleeding.  Your hemoglobin is just above the criteria to require a blood transfusion.  In order to treat your vaginal bleeding I have given you prescription for Provera, which is a hormone medication, the same medication that you were given when you were hospitalized last year.  Ultimately you will need to see an OB/GYN.  It looks like the OB/GYN that you saw in 2023 recommended an endometrial biopsy as an outpatient.  You need to make sure that you get this done.  I have given you 2 options.  I have given you a referral to schedule an appointment with [facility] gynecology through [facility] or I have given you the contact information to see Dr. Tolu Davis.  This is the gynecologist that saw you on your admission last year but it looks like he is now with [facility].  If you have passing out episodes, develop worsening vaginal bleeding, specifically soaking greater than 1 pad per hour for 2 hours consecutively, please return to the emergency department.    Lastly, you did test positive for bacterial vaginosis which is an overgrowth of the normal bacteria that lives in the vagina.  The treatment for this is Flagyl.  Please take the entire course of this medication.  Please do not drink any alcohol while you are taking this medication.  This is not a sexually transmitted infection.

## 2024-07-19 LAB
C TRACH DNA CVX QL NAA+PROBE: NEGATIVE
N GONORRHOEA DNA CERV MUCUS QL NAA+PROBE: NEGATIVE

## 2024-07-21 ENCOUNTER — HOSPITAL ENCOUNTER (INPATIENT)
Age: 39
LOS: 1 days | Discharge: HOME | DRG: 663 | End: 2024-07-22
Admitting: FAMILY MEDICINE
Payer: MEDICAID

## 2024-07-21 DIAGNOSIS — R53.1 GENERAL WEAKNESS: Primary | ICD-10-CM

## 2024-07-21 DIAGNOSIS — R51.9 ACUTE NONINTRACTABLE HEADACHE, UNSPECIFIED HEADACHE TYPE: ICD-10-CM

## 2024-07-21 DIAGNOSIS — D62 ACUTE BLOOD LOSS ANEMIA: ICD-10-CM

## 2024-07-21 PROBLEM — D64.9 ANEMIA: Status: ACTIVE | Noted: 2024-07-21

## 2024-07-21 LAB
ABO + RH BLD: NORMAL
ANION GAP SERPL CALCULATED.3IONS-SCNC: 12 MMOL/L (ref 3–16)
BASOPHILS # BLD: 0 K/UL (ref 0–0.2)
BASOPHILS NFR BLD: 0.4 %
BLD GP AB SCN SERPL QL: NORMAL
BLOOD BANK DISPENSE STATUS: NORMAL
BLOOD BANK DISPENSE STATUS: NORMAL
BLOOD BANK PRODUCT CODE: NORMAL
BLOOD BANK PRODUCT CODE: NORMAL
BPU ID: NORMAL
BPU ID: NORMAL
BUN SERPL-MCNC: 8 MG/DL (ref 7–20)
CALCIUM SERPL-MCNC: 9 MG/DL (ref 8.3–10.6)
CHLORIDE SERPL-SCNC: 102 MMOL/L (ref 99–110)
CO2 SERPL-SCNC: 22 MMOL/L (ref 21–32)
CREAT SERPL-MCNC: 0.6 MG/DL (ref 0.6–1.1)
DEPRECATED RDW RBC AUTO: 18.6 % (ref 12.4–15.4)
DESCRIPTION BLOOD BANK: NORMAL
DESCRIPTION BLOOD BANK: NORMAL
EOSINOPHIL # BLD: 0 K/UL (ref 0–0.6)
EOSINOPHIL NFR BLD: 0.5 %
GFR SERPLBLD CREATININE-BSD FMLA CKD-EPI: >90 ML/MIN/{1.73_M2}
GLUCOSE SERPL-MCNC: 126 MG/DL (ref 70–99)
HCG SERPL QL: NEGATIVE
HCT VFR BLD AUTO: 23 % (ref 36–48)
HGB BLD-MCNC: 6.8 G/DL (ref 12–16)
LYMPHOCYTES # BLD: 1.6 K/UL (ref 1–5.1)
LYMPHOCYTES NFR BLD: 18.7 %
MAGNESIUM SERPL-MCNC: 2 MG/DL (ref 1.8–2.4)
MCH RBC QN AUTO: 21.2 PG (ref 26–34)
MCHC RBC AUTO-ENTMCNC: 29.6 G/DL (ref 31–36)
MCV RBC AUTO: 71.7 FL (ref 80–100)
MONOCYTES # BLD: 0.4 K/UL (ref 0–1.3)
MONOCYTES NFR BLD: 5.2 %
NEUTROPHILS # BLD: 6.3 K/UL (ref 1.7–7.7)
NEUTROPHILS NFR BLD: 75.2 %
PLATELET # BLD AUTO: 421 K/UL (ref 135–450)
PMV BLD AUTO: 8.7 FL (ref 5–10.5)
POTASSIUM SERPL-SCNC: 3.5 MMOL/L (ref 3.5–5.1)
RBC # BLD AUTO: 3.21 M/UL (ref 4–5.2)
SODIUM SERPL-SCNC: 136 MMOL/L (ref 136–145)
WBC # BLD AUTO: 8.4 K/UL (ref 4–11)

## 2024-07-21 PROCEDURE — 99285 EMERGENCY DEPT VISIT HI MDM: CPT

## 2024-07-21 PROCEDURE — 84703 CHORIONIC GONADOTROPIN ASSAY: CPT

## 2024-07-21 PROCEDURE — 86923 COMPATIBILITY TEST ELECTRIC: CPT

## 2024-07-21 PROCEDURE — 6360000002 HC RX W HCPCS

## 2024-07-21 PROCEDURE — 86850 RBC ANTIBODY SCREEN: CPT

## 2024-07-21 PROCEDURE — 86900 BLOOD TYPING SEROLOGIC ABO: CPT

## 2024-07-21 PROCEDURE — 96374 THER/PROPH/DIAG INJ IV PUSH: CPT

## 2024-07-21 PROCEDURE — 96361 HYDRATE IV INFUSION ADD-ON: CPT

## 2024-07-21 PROCEDURE — 85025 COMPLETE CBC W/AUTO DIFF WBC: CPT

## 2024-07-21 PROCEDURE — 96375 TX/PRO/DX INJ NEW DRUG ADDON: CPT

## 2024-07-21 PROCEDURE — 86901 BLOOD TYPING SEROLOGIC RH(D): CPT

## 2024-07-21 PROCEDURE — 36430 TRANSFUSION BLD/BLD COMPNT: CPT

## 2024-07-21 PROCEDURE — 80048 BASIC METABOLIC PNL TOTAL CA: CPT

## 2024-07-21 PROCEDURE — 30233N1 TRANSFUSION OF NONAUTOLOGOUS RED BLOOD CELLS INTO PERIPHERAL VEIN, PERCUTANEOUS APPROACH: ICD-10-PCS | Performed by: INTERNAL MEDICINE

## 2024-07-21 PROCEDURE — 2060000000 HC ICU INTERMEDIATE R&B

## 2024-07-21 PROCEDURE — P9016 RBC LEUKOCYTES REDUCED: HCPCS

## 2024-07-21 PROCEDURE — 83735 ASSAY OF MAGNESIUM: CPT

## 2024-07-21 PROCEDURE — 2580000003 HC RX 258

## 2024-07-21 RX ORDER — ONDANSETRON 2 MG/ML
8 INJECTION INTRAMUSCULAR; INTRAVENOUS ONCE
Status: COMPLETED | OUTPATIENT
Start: 2024-07-21 | End: 2024-07-21

## 2024-07-21 RX ORDER — MAGNESIUM SULFATE IN WATER 40 MG/ML
2000 INJECTION, SOLUTION INTRAVENOUS PRN
Status: DISCONTINUED | OUTPATIENT
Start: 2024-07-21 | End: 2024-07-22 | Stop reason: HOSPADM

## 2024-07-21 RX ORDER — ACETAMINOPHEN 325 MG/1
650 TABLET ORAL EVERY 6 HOURS PRN
Status: DISCONTINUED | OUTPATIENT
Start: 2024-07-21 | End: 2024-07-22

## 2024-07-21 RX ORDER — 0.9 % SODIUM CHLORIDE 0.9 %
1000 INTRAVENOUS SOLUTION INTRAVENOUS ONCE
Status: COMPLETED | OUTPATIENT
Start: 2024-07-21 | End: 2024-07-21

## 2024-07-21 RX ORDER — POLYETHYLENE GLYCOL 3350 17 G/17G
17 POWDER, FOR SOLUTION ORAL DAILY PRN
Status: DISCONTINUED | OUTPATIENT
Start: 2024-07-21 | End: 2024-07-22 | Stop reason: HOSPADM

## 2024-07-21 RX ORDER — POTASSIUM CHLORIDE 7.45 MG/ML
10 INJECTION INTRAVENOUS PRN
Status: DISCONTINUED | OUTPATIENT
Start: 2024-07-21 | End: 2024-07-22 | Stop reason: HOSPADM

## 2024-07-21 RX ORDER — ACETAMINOPHEN 650 MG/1
650 SUPPOSITORY RECTAL EVERY 6 HOURS PRN
Status: DISCONTINUED | OUTPATIENT
Start: 2024-07-21 | End: 2024-07-22

## 2024-07-21 RX ORDER — SODIUM CHLORIDE 9 MG/ML
INJECTION, SOLUTION INTRAVENOUS PRN
Status: DISCONTINUED | OUTPATIENT
Start: 2024-07-21 | End: 2024-07-22 | Stop reason: HOSPADM

## 2024-07-21 RX ORDER — KETOROLAC TROMETHAMINE 30 MG/ML
15 INJECTION, SOLUTION INTRAMUSCULAR; INTRAVENOUS ONCE
Status: COMPLETED | OUTPATIENT
Start: 2024-07-21 | End: 2024-07-21

## 2024-07-21 RX ORDER — DIPHENHYDRAMINE HYDROCHLORIDE 50 MG/ML
25 INJECTION INTRAMUSCULAR; INTRAVENOUS ONCE
Status: COMPLETED | OUTPATIENT
Start: 2024-07-21 | End: 2024-07-21

## 2024-07-21 RX ORDER — POTASSIUM CHLORIDE 20 MEQ/1
40 TABLET, EXTENDED RELEASE ORAL PRN
Status: DISCONTINUED | OUTPATIENT
Start: 2024-07-21 | End: 2024-07-22 | Stop reason: HOSPADM

## 2024-07-21 RX ORDER — SODIUM CHLORIDE 9 MG/ML
INJECTION, SOLUTION INTRAVENOUS CONTINUOUS
Status: DISCONTINUED | OUTPATIENT
Start: 2024-07-21 | End: 2024-07-22 | Stop reason: HOSPADM

## 2024-07-21 RX ADMIN — KETOROLAC TROMETHAMINE 15 MG: 30 INJECTION INTRAMUSCULAR; INTRAVENOUS at 17:34

## 2024-07-21 RX ADMIN — DIPHENHYDRAMINE HYDROCHLORIDE 25 MG: 50 INJECTION, SOLUTION INTRAMUSCULAR; INTRAVENOUS at 17:34

## 2024-07-21 RX ADMIN — ONDANSETRON 8 MG: 2 INJECTION INTRAMUSCULAR; INTRAVENOUS at 17:33

## 2024-07-21 RX ADMIN — SODIUM CHLORIDE 1000 ML: 9 INJECTION, SOLUTION INTRAVENOUS at 17:30

## 2024-07-21 NOTE — ED PROVIDER NOTES
THE [facility]  EMERGENCY DEPARTMENT ENCOUNTER          NURSE PRACTITIONER NOTE       Date of evaluation: 7/21/2024    Chief Complaint     Fatigue (Was seen here for excessive vaginal bleeding recently, no longer bleeding but for the last couple of days has felt weak, has palpitations, nausea and abdominal pain)      History of Present Illness     Elvira Corbett is a 38 y.o. female who presents who presents to the emergency department complaints of feeling overall fatigue with some slight dizziness, and headache for the last several days.  Patient states she was recently in the ED last week for continued vaginal bleeding for a prolonged period of time.  Patient was given Provera and asked to follow-up with OB/GYN concerning her prolonged vaginal bleeding.  Patient states she is been taking her Provera medication and her vaginal bleeding has ceased.  Patient states since she left the ED last week she has been feeling increasingly fatigued with dizziness and shortness of breath with any type of activity.  Patient states she is unable to walk upstairs as she feels her heart pounding in her chest with any type of exertion.  Patient denies any falls, chest pain, nausea, vomiting.  Patient states she does feel that she has an unsettled belly however has not had any diarrhea or vomiting.    ASSESSMENT / PLAN  (MEDICAL DECISION MAKING)     INITIAL VITALS: BP: 118/65, Temp: 98.1 °F (36.7 °C), Pulse: 83, Respirations: 16, SpO2: 100 %     Elvira Corbett is a 38 y.o. female who presents to the emergency department complaints of overall generalized fatigue and activity intolerance that has worsened over the last several days since being discharged after prolonged vaginal bleeding..  On presentation patient is in overall ill-appearing 30-year-old female resting in bed in mild distress.  Vital signs are stable, ABCs are intact.  Patient is alert and orient x 3 to GCS of 15, with no neurofocal deficits.  Breath sounds

## 2024-07-21 NOTE — ED PROVIDER NOTES
ED Attending Attestation Note     Date of evaluation: 7/21/2024    This patient was seen by the advance practice provider.  I have seen and examined the patient, agree with the workup, evaluation, management and diagnosis. The care plan has been discussed.  Patient has had eventual cessation of her vaginal bleeding since prior emergency department visit 4 days ago.  However, she did come back with increased fatigue.  She is not currently having any active vaginal bleeding.  On my exam her abdomen is soft nontender, lungs are clear, pulses within normal limits and regular, pale conjunctiva.  Hemoglobin is 6.8.  I do suspect she had a significant mount of bleeding from this vaginal bleeding which has been going on for the last week prior to her most recent emergency department visit.  Due to the need for blood transfusion she will require hospitalization.  I did discuss this with the patient seemed hesitant on whether or not she would like to stay.  Our recommendations will be hospitalization with gynecology assessment in the morning.  Full disposition pending LINDA reevaluation and discussion with the patient.         Karson Nunez MD  07/21/24 [id number]

## 2024-07-22 ENCOUNTER — APPOINTMENT (OUTPATIENT)
Dept: ULTRASOUND IMAGING | Age: 39
DRG: 663 | End: 2024-07-22
Payer: MEDICAID

## 2024-07-22 VITALS
OXYGEN SATURATION: 100 % | WEIGHT: 176.15 LBS | RESPIRATION RATE: 16 BRPM | BODY MASS INDEX: 30.07 KG/M2 | TEMPERATURE: 98.4 F | HEART RATE: 68 BPM | SYSTOLIC BLOOD PRESSURE: 97 MMHG | DIASTOLIC BLOOD PRESSURE: 59 MMHG | HEIGHT: 64 IN

## 2024-07-22 LAB
ALBUMIN SERPL-MCNC: 3.5 G/DL (ref 3.4–5)
ALBUMIN/GLOB SERPL: 1.2 {RATIO} (ref 1.1–2.2)
ALP SERPL-CCNC: 53 U/L (ref 40–129)
ALT SERPL-CCNC: 6 U/L (ref 10–40)
AMORPH SED URNS QL MICRO: NORMAL /HPF
ANION GAP SERPL CALCULATED.3IONS-SCNC: 9 MMOL/L (ref 3–16)
APTT BLD: 28.7 SEC (ref 22.1–36.4)
AST SERPL-CCNC: 16 U/L (ref 15–37)
BASOPHILS # BLD: 0.1 K/UL (ref 0–0.2)
BASOPHILS NFR BLD: 1.2 %
BILIRUB SERPL-MCNC: <0.2 MG/DL (ref 0–1)
BILIRUB UR QL STRIP.AUTO: NEGATIVE
BUN SERPL-MCNC: 11 MG/DL (ref 7–20)
CALCIUM SERPL-MCNC: 8.3 MG/DL (ref 8.3–10.6)
CHLORIDE SERPL-SCNC: 106 MMOL/L (ref 99–110)
CLARITY UR: CLEAR
CO2 SERPL-SCNC: 23 MMOL/L (ref 21–32)
COLOR UR: YELLOW
CREAT SERPL-MCNC: 0.7 MG/DL (ref 0.6–1.1)
DEPRECATED RDW RBC AUTO: 23.6 % (ref 12.4–15.4)
EKG ATRIAL RATE: 71 BPM
EKG DIAGNOSIS: NORMAL
EKG P AXIS: 70 DEGREES
EKG P-R INTERVAL: 148 MS
EKG Q-T INTERVAL: 402 MS
EKG QRS DURATION: 88 MS
EKG QTC CALCULATION (BAZETT): 436 MS
EKG R AXIS: 43 DEGREES
EKG T AXIS: 52 DEGREES
EKG VENTRICULAR RATE: 71 BPM
EOSINOPHIL # BLD: 0.1 K/UL (ref 0–0.6)
EOSINOPHIL NFR BLD: 1.3 %
EPI CELLS #/AREA URNS HPF: NORMAL /HPF (ref 0–5)
FERRITIN SERPL IA-MCNC: 20.2 NG/ML (ref 15–150)
GFR SERPLBLD CREATININE-BSD FMLA CKD-EPI: >90 ML/MIN/{1.73_M2}
GLUCOSE SERPL-MCNC: 100 MG/DL (ref 70–99)
GLUCOSE UR STRIP.AUTO-MCNC: NEGATIVE MG/DL
HCT VFR BLD AUTO: 23.8 % (ref 36–48)
HCT VFR BLD AUTO: 24.1 % (ref 36–48)
HCT VFR BLD AUTO: 27.2 % (ref 36–48)
HGB BLD-MCNC: 7.4 G/DL (ref 12–16)
HGB BLD-MCNC: 7.5 G/DL (ref 12–16)
HGB BLD-MCNC: 8.5 G/DL (ref 12–16)
HGB UR QL STRIP.AUTO: ABNORMAL
INR PPP: 0.99 (ref 0.85–1.15)
IRON SATN MFR SERPL: 6 % (ref 15–50)
IRON SERPL-MCNC: 21 UG/DL (ref 37–145)
KETONES UR STRIP.AUTO-MCNC: NEGATIVE MG/DL
LEUKOCYTE ESTERASE UR QL STRIP.AUTO: ABNORMAL
LYMPHOCYTES # BLD: 1.9 K/UL (ref 1–5.1)
LYMPHOCYTES NFR BLD: 24.4 %
MCH RBC QN AUTO: 23.9 PG (ref 26–34)
MCHC RBC AUTO-ENTMCNC: 31.3 G/DL (ref 31–36)
MCV RBC AUTO: 76.4 FL (ref 80–100)
MONOCYTES # BLD: 0.6 K/UL (ref 0–1.3)
MONOCYTES NFR BLD: 8.5 %
NEUTROPHILS # BLD: 4.9 K/UL (ref 1.7–7.7)
NEUTROPHILS NFR BLD: 64.6 %
NITRITE UR QL STRIP.AUTO: NEGATIVE
PH UR STRIP.AUTO: 6 [PH] (ref 5–8)
PLATELET # BLD AUTO: 336 K/UL (ref 135–450)
PMV BLD AUTO: 8.6 FL (ref 5–10.5)
POTASSIUM SERPL-SCNC: 4.1 MMOL/L (ref 3.5–5.1)
PROT SERPL-MCNC: 6.4 G/DL (ref 6.4–8.2)
PROT UR STRIP.AUTO-MCNC: NEGATIVE MG/DL
PROTHROMBIN TIME: 13.3 SEC (ref 11.9–14.9)
RBC # BLD AUTO: 3.15 M/UL (ref 4–5.2)
RBC #/AREA URNS HPF: NORMAL /HPF (ref 0–4)
SODIUM SERPL-SCNC: 138 MMOL/L (ref 136–145)
SP GR UR STRIP.AUTO: >=1.03 (ref 1–1.03)
TIBC SERPL-MCNC: 364 UG/DL (ref 260–445)
TRANSFERRIN SERPL-MCNC: 286 MG/DL (ref 200–360)
TROPONIN, HIGH SENSITIVITY: <6 NG/L (ref 0–14)
TROPONIN, HIGH SENSITIVITY: <6 NG/L (ref 0–14)
UA COMPLETE W REFLEX CULTURE PNL UR: ABNORMAL
UA DIPSTICK W REFLEX MICRO PNL UR: YES
URN SPEC COLLECT METH UR: ABNORMAL
UROBILINOGEN UR STRIP-ACNC: 0.2 E.U./DL
WBC # BLD AUTO: 7.6 K/UL (ref 4–11)
WBC #/AREA URNS HPF: NORMAL /HPF (ref 0–5)

## 2024-07-22 PROCEDURE — 82728 ASSAY OF FERRITIN: CPT

## 2024-07-22 PROCEDURE — 76856 US EXAM PELVIC COMPLETE: CPT

## 2024-07-22 PROCEDURE — 84484 ASSAY OF TROPONIN QUANT: CPT

## 2024-07-22 PROCEDURE — 81001 URINALYSIS AUTO W/SCOPE: CPT

## 2024-07-22 PROCEDURE — 85018 HEMOGLOBIN: CPT

## 2024-07-22 PROCEDURE — 6360000002 HC RX W HCPCS: Performed by: INTERNAL MEDICINE

## 2024-07-22 PROCEDURE — 85610 PROTHROMBIN TIME: CPT

## 2024-07-22 PROCEDURE — 2580000003 HC RX 258: Performed by: FAMILY MEDICINE

## 2024-07-22 PROCEDURE — 36415 COLL VENOUS BLD VENIPUNCTURE: CPT

## 2024-07-22 PROCEDURE — 6360000002 HC RX W HCPCS

## 2024-07-22 PROCEDURE — 51798 US URINE CAPACITY MEASURE: CPT

## 2024-07-22 PROCEDURE — 85014 HEMATOCRIT: CPT

## 2024-07-22 PROCEDURE — 80053 COMPREHEN METABOLIC PANEL: CPT

## 2024-07-22 PROCEDURE — 83540 ASSAY OF IRON: CPT

## 2024-07-22 PROCEDURE — 93010 ELECTROCARDIOGRAM REPORT: CPT | Performed by: INTERNAL MEDICINE

## 2024-07-22 PROCEDURE — 76830 TRANSVAGINAL US NON-OB: CPT

## 2024-07-22 PROCEDURE — 6370000000 HC RX 637 (ALT 250 FOR IP): Performed by: INTERNAL MEDICINE

## 2024-07-22 PROCEDURE — 85730 THROMBOPLASTIN TIME PARTIAL: CPT

## 2024-07-22 PROCEDURE — 93005 ELECTROCARDIOGRAM TRACING: CPT | Performed by: FAMILY MEDICINE

## 2024-07-22 PROCEDURE — 2580000003 HC RX 258

## 2024-07-22 PROCEDURE — 84466 ASSAY OF TRANSFERRIN: CPT

## 2024-07-22 PROCEDURE — 2580000003 HC RX 258: Performed by: INTERNAL MEDICINE

## 2024-07-22 PROCEDURE — 99222 1ST HOSP IP/OBS MODERATE 55: CPT | Performed by: OBSTETRICS & GYNECOLOGY

## 2024-07-22 PROCEDURE — 6370000000 HC RX 637 (ALT 250 FOR IP)

## 2024-07-22 PROCEDURE — 6370000000 HC RX 637 (ALT 250 FOR IP): Performed by: FAMILY MEDICINE

## 2024-07-22 PROCEDURE — 85025 COMPLETE CBC W/AUTO DIFF WBC: CPT

## 2024-07-22 RX ORDER — ENOXAPARIN SODIUM 100 MG/ML
40 INJECTION SUBCUTANEOUS DAILY
Status: DISCONTINUED | OUTPATIENT
Start: 2024-07-22 | End: 2024-07-22

## 2024-07-22 RX ORDER — FERROUS SULFATE 325(65) MG
325 TABLET ORAL
Qty: 30 TABLET | Refills: 0 | Status: SHIPPED | OUTPATIENT
Start: 2024-07-22

## 2024-07-22 RX ORDER — ASPIRIN 81 MG/1
81 TABLET ORAL DAILY
COMMUNITY

## 2024-07-22 RX ORDER — BUTALBITAL, ACETAMINOPHEN AND CAFFEINE 50; 325; 40 MG/1; MG/1; MG/1
1 TABLET ORAL EVERY 4 HOURS PRN
Status: DISCONTINUED | OUTPATIENT
Start: 2024-07-22 | End: 2024-07-22 | Stop reason: HOSPADM

## 2024-07-22 RX ORDER — METRONIDAZOLE 500 MG/1
500 TABLET ORAL 2 TIMES DAILY
Qty: 6 TABLET | Refills: 0 | Status: SHIPPED | OUTPATIENT
Start: 2024-07-22 | End: 2024-07-25

## 2024-07-22 RX ORDER — MEDROXYPROGESTERONE ACETATE 10 MG/1
10 TABLET ORAL DAILY
Status: DISCONTINUED | OUTPATIENT
Start: 2024-07-22 | End: 2024-07-22 | Stop reason: HOSPADM

## 2024-07-22 RX ADMIN — IRON SUCROSE 100 MG: 20 INJECTION, SOLUTION INTRAVENOUS at 14:40

## 2024-07-22 RX ADMIN — ACETAMINOPHEN 650 MG: 325 TABLET ORAL at 06:14

## 2024-07-22 RX ADMIN — PANTOPRAZOLE SODIUM 40 MG: 40 INJECTION, POWDER, FOR SOLUTION INTRAVENOUS at 09:12

## 2024-07-22 RX ADMIN — BUTALBITAL, ACETAMINOPHEN, AND CAFFEINE 1 TABLET: 50; 325; 40 TABLET ORAL at 14:44

## 2024-07-22 RX ADMIN — MEDROXYPROGESTERONE ACETATE 10 MG: 10 TABLET ORAL at 12:15

## 2024-07-22 RX ADMIN — SODIUM CHLORIDE: 9 INJECTION, SOLUTION INTRAVENOUS at 00:20

## 2024-07-22 NOTE — DISCHARGE SUMMARY
mouth 2 times daily for 7 days            Activity: activity as tolerated  Diet: regular diet  Wound Care: none needed    Time Spent on discharge is more than 20 minutes    Signed:  Henri Smith MD,  PGY-1  7/22/2024

## 2024-07-22 NOTE — PROGRESS NOTES
07/22/24 1344   Encounter Summary   Service Provided For Patient not available  (Ultra sound test)   Referral/Consult From Nurse   Last Encounter  07/22/24  (ME. Attempted visit, pt getting test per RN, will check back today)

## 2024-07-22 NOTE — CONSULTS
Department of Gynecology History and Physical      CHIEF COMPLAINT: Abnormal vaginal bleeding    History obtained from patient    HISTORY OF PRESENT ILLNESS:    The patient is a 38 y.o.  female with significant past medical history of heavy menses who presents with heavy menses.  Current pt of Dr Davis.  Last pap last year.  Last year she tried ocps but quickly stopped them.  Tried on provera which controlled her bleeding but she then stopped taking it.  Hb on admission 6.8.  transfused 2 units prbcs.    Past Medical History:        Diagnosis Date    Schizophrenia (HCC)     Seasonal allergies      Past Surgical History:        Procedure Laterality Date    DILATION AND CURETTAGE OF UTERUS      INDUCED          Past Gynecological History:    1. Last menstrual period:  currently  2. Menses: monthly, irreg, heavy- bleeding nearly every day  3. Contraception: None  4. Sexually transmitted disease history: none  5. Number of sexual partners in the last 6 months: 1    Meds:  Current Facility-Administered Medications:     medroxyPROGESTERone (PROVERA) tablet 10 mg, 10 mg, Oral, Daily, Chiquis Arana MD    pantoprazole (PROTONIX) 40 mg in sodium chloride (PF) 0.9 % 10 mL injection, 40 mg, IntraVENous, Daily, Chiquis Arana MD, 40 mg at 24 0912    0.9 % sodium chloride infusion, , IntraVENous, PRN, Luis Manuel Page APRN - NP    potassium chloride (KLOR-CON M) extended release tablet 40 mEq, 40 mEq, Oral, PRN **OR** potassium bicarb-citric acid (EFFER-K) effervescent tablet 40 mEq, 40 mEq, Oral, PRN **OR** potassium chloride 10 mEq/100 mL IVPB (Peripheral Line), 10 mEq, IntraVENous, PRN, Jaime Shell MD    magnesium sulfate 2000 mg in 50 mL IVPB premix, 2,000 mg, IntraVENous, PRN, Jaime Shell MD    polyethylene glycol (GLYCOLAX) packet 17 g, 17 g, Oral, Daily PRN, Jaime Shell MD    acetaminophen (TYLENOL) tablet 650 mg, 650 mg, Oral, Q6H PRN, 650 mg at 24 0614 **OR** acetaminophen

## 2024-07-22 NOTE — PROGRESS NOTES
Pt admitted to room 4324 from ED via stretcher with blood transfusion running 75ml/hr. Pt vitals are obtain and stable, alert and oriented x4, ambulatory. Pt denies any pain, sob, or dizziness. Pt standard safety measures in place. No skin breakdown noted on admission.

## 2024-07-22 NOTE — PLAN OF CARE
Pt. Reports \"chest heaviness\" that she felt prior to going down for ultrasound. She says it's more of a discomfort rather than pain and feels similar to her headache pain. She denies the pain radiates anywhere, and denies SOB, N/V, or abdominal pain. EKG and troponins ordered.

## 2024-07-22 NOTE — PLAN OF CARE
Problem: Discharge Planning  Goal: Discharge to home or other facility with appropriate resources  Outcome: Completed     Problem: Safety - Adult  Goal: Free from fall injury  Outcome: Completed     Problem: Pain  Goal: Verbalizes/displays adequate comfort level or baseline comfort level  Outcome: Completed     Problem: Skin/Tissue Integrity  Goal: Absence of new skin breakdown  Description: 1.  Monitor for areas of redness and/or skin breakdown  2.  Assess vascular access sites hourly  3.  Every 4-6 hours minimum:  Change oxygen saturation probe site  4.  Every 4-6 hours:  If on nasal continuous positive airway pressure, respiratory therapy assess nares and determine need for appliance change or resting period.  Outcome: Completed     Problem: Hematologic - Adult  Goal: Maintains hematologic stability  Outcome: Completed

## 2024-07-22 NOTE — PROGRESS NOTES
NAME:  Elvira Corbett  YOB: 1985  MEDICAL RECORD NUMBER:  [id number]       Nurse 1 eSignature: Electronically signed by Anne Marie Santos RN on 7/22/24 at 6:17 PM EDT       Patient educated, handouts and Meds 2 beds given. IV's removed, Telemetry box removed, pt transport to home with family.   No questions noted.

## 2024-07-22 NOTE — PROGRESS NOTES
Internal Medicine Progress Note    Date: 2024   Patient: Elvira Corbett   [facility] Day: 1      CC: Fatigue (Was seen here for excessive vaginal bleeding recently, no longer bleeding but for the last couple of days has felt weak, has palpitations, nausea and abdominal pain)       Interval Hx   HgB 6.8 upon admission received 2x pRBC 7.5HgB this AM. She reports a generalized headache since last night that has improved with Tylenol. She denies any current bleeding during current hospitalization.     OB/GYN Consult   -Pending transvaginal and pelvic US  -No acute intervention   -f/u outpatient    IV venofer today    HPI:     Elvira Corbett is a 38 y.o.  with PMH vaginal bleeding s/p admission last year w/outpatient GYN f/u with pending uterine bx that was not conducted, on provera, depression, catatonia who presents with a chief complaint of weakness.  Patient reports that her to symptoms began at her father's house where she began to experience shortness of breath upon exertion while going up stairs.  Patient reports that she has associated palpitations, weakness, lightheadedness, and generalized fatigue.  She has notable history of abnormal uterine bleeding of which she reports that the bleeding has stopped since her last visit in the ED.  She denies any other acute complaints upon complete ROS.  Patient reported having headache initially at presentation and reports that at this time it has resolved.  She reports also having nausea.She reports that she is sexually active with last sexual encounter about 2 weeks ago.  She reports she does not use contraception.  Patient reports that she does not have any abnormal discharge or any concerning findings.  She denies any urinary complaints.     She does have a history of abnormal uterine bleeding which began about 1 year ago.  She has not admission at that time were she did have ovarian cyst elucidated and she was seen by GYN.  Patient reports that prior

## 2024-07-22 NOTE — PROGRESS NOTES
07/22/24 1347   Encounter Summary   Encounter Overview/Reason   (spiriual consult requested)   Service Provided For Patient not available   Referral/Consult From Nurse   Last Encounter  07/22/24   Begin Time 1347   End Time  1355   Total Time Calculated 8 min     Patient having procedure.   will visit 7/23/2024

## 2024-07-22 NOTE — PROGRESS NOTES
Pt c/o worsening HA. Pt offers tylenol, she said tylenol is not working for her, asking for another pain med. Notify Dr. Yusuf, he states admitting physician Dr. Arana to hold for any pain med at this time, try tylenol first. Talk to the pt, explain and educate about pain med, she agrees to try tylenol. Tylenol given.

## 2024-07-22 NOTE — PROGRESS NOTES
Follow up visit. Pt with her . Pt tearful, asked  to pray for her. Pt identifies as Gnosticism.  offered prayer at bedside for pt and spouse. No other needs. Pt expressed gratitude.  available for any other spiritual/emotional needs.

## 2024-07-22 NOTE — PROGRESS NOTES
4 Eyes Skin Assessment     NAME:  Elvira Corbett  YOB: 1985  MEDICAL RECORD NUMBER:  [id number]    The patient is being assessed for  Admission    I agree that at least one RN has performed a thorough Head to Toe Skin Assessment on the patient. ALL assessment sites listed below have been assessed.      Areas assessed by both nurses:    Head, Face, Ears, Shoulders, Back, Chest, Arms, Elbows, Hands, Sacrum. Buttock, Coccyx, Ischium, Legs. Feet and Heels, and Under Medical Devices         Does the Patient have a Wound? No noted wound(s)    Keloid upper chest       Ismael Prevention initiated by RN: Yes  Wound Care Orders initiated by RN: No    Pressure Injury (Stage 3,4, Unstageable, DTI, NWPT, and Complex wounds) if present, place Wound referral order by RN under : No    New Ostomies, if present place, Ostomy referral order under : No     Nurse 1 eSignature: Electronically signed by Klaudia Ng RN on 7/21/24 at 11:35 PM EDT    **SHARE this note so that the co-signing nurse can place an eSignature**    Nurse 2 eSignature: Electronically signed by Yen Lozoya RN on 7/22/24 at 4:08 AM EDT

## 2024-07-22 NOTE — CARE COORDINATION
CM noted DC order- chart reviewed, pt from home independently. No needs identified prior to DC.     Thank you  Cat Jj BROWN, BSN, CM  PCU   [phone]

## 2024-07-22 NOTE — PLAN OF CARE
Problem: Discharge Planning  Goal: Discharge to home or other facility with appropriate resources  7/22/2024 0318 by Klaudia Ng RN  Outcome: Progressing  Flowsheets (Taken 7/22/2024 0318)  Discharge to home or other facility with appropriate resources:   Identify barriers to discharge with patient and caregiver   Identify discharge learning needs (meds, wound care, etc)   Arrange for needed discharge resources and transportation as appropriate     Problem: Safety - Adult  Goal: Free from fall injury  Outcome: Progressing  Flowsheets (Taken 7/22/2024 0318)  Free From Fall Injury: Instruct family/caregiver on patient safety     Problem: Pain  Goal: Verbalizes/displays adequate comfort level or baseline comfort level  Outcome: Progressing  Flowsheets (Taken 7/22/2024 0318)  Verbalizes/displays adequate comfort level or baseline comfort level:   Encourage patient to monitor pain and request assistance   Administer analgesics based on type and severity of pain and evaluate response   Assess pain using appropriate pain scale   Implement non-pharmacological measures as appropriate and evaluate response     Problem: Skin/Tissue Integrity  Goal: Absence of new skin breakdown  Description: 1.  Monitor for areas of redness and/or skin breakdown  2.  Assess vascular access sites hourly  3.  Every 4-6 hours minimum:  Change oxygen saturation probe site  4.  Every 4-6 hours:  If on nasal continuous positive airway pressure, respiratory therapy assess nares and determine need for appliance change or resting period.  Outcome: Progressing     Problem: Hematologic - Adult  Goal: Maintains hematologic stability  Outcome: Progressing  Flowsheets (Taken 7/22/2024 0318)  Maintains hematologic stability:   Assess for signs and symptoms of bleeding or hemorrhage   Administer blood products/factors as ordered   Monitor labs for bleeding or clotting disorders

## 2024-07-22 NOTE — CONSULTS
Clinical Pharmacy Consult Note  Medication History     Admit Date: 7/21/2024    Pharmacy consulted to verify home medication list by Dr. Arana.    List of of current medications patient is taking is complete. Home Medication list in EPIC updated to reflect changes noted below.    Source of information: Rx dispense history, interview with patient    Changes made to medication list:   Medications added:   Aspirin 81mg po daily - pt states she started taking when she started taking Provera due to concern for blood clots  Medication doses adjusted:   Ferrous sulfate - pt has been taking 1x daily per instructions on bottle (OTC bottle)    Current Outpatient Medications   Medication Instructions    aspirin 81 mg, Oral, DAILY    ferrous sulfate (IRON 325) 325 mg, Oral, 2 TIMES DAILY    medroxyPROGESTERone (PROVERA) 10 mg, Oral, DAILY    metroNIDAZOLE (FLAGYL) 500 mg, Oral, 2 TIMES DAILY       Please call with questions--  Justina Caballero, PharmD, [facility]  Wireless: [phone]   7/22/2024 10:18 AM

## 2024-07-22 NOTE — DISCHARGE INSTRUCTIONS
Elvira Corbett was seen at [facility] [place] from 7/21-7/22 for abnormal uterine bleeding. You were found to be anemic and given 2 units of red blood cells and IV iron. You were seen by OB/GYN and they recommended a transvaginal and pelvic US as well as outpatient follow-up.     Please follow up with Dr. Rosalio Rod outpatient Gynecology as well as your primary care provider within the week.     Please continue taking your metronidazole to complete a 7 day total course.     Please call 911 or return to the ED if you experience chest pain, SOB, lightheadedness, or uncontrollable vaginal bleeding.

## 2024-07-22 NOTE — ED NOTES
ED TO INPATIENT SBAR HANDOFF    Patient Name: Elvira Corbett   :  1985  38 y.o.   MRN:  [id number]  Preferred Name    ED Room #:  B21/B21-21  Family/Caregiver Present no   Restraints no   Sitter no   Sepsis Risk Score      Situation  Code Status: Full Code No additional code details.    Allergies: Methotrexate  Weight: Patient Vitals for the past 96 hrs (Last 3 readings):   Weight   24 1442 79 kg (174 lb 2.6 oz)     Arrived from: home  Chief Complaint:   Chief Complaint   Patient presents with    Fatigue     Was seen here for excessive vaginal bleeding recently, no longer bleeding but for the last couple of days has felt weak, has palpitations, nausea and abdominal pain     Hospital Problem/Diagnosis:  Principal Problem:    Anemia  Resolved Problems:    * No resolved hospital problems. *    Imaging:   No orders to display     Abnormal labs:   Abnormal Labs Reviewed   CBC WITH AUTO DIFFERENTIAL - Abnormal; Notable for the following components:       Result Value    RBC 3.21 (*)     Hemoglobin 6.8 (*)     Hematocrit 23.0 (*)     MCV 71.7 (*)     MCH 21.2 (*)     MCHC 29.6 (*)     RDW 18.6 (*)     All other components within normal limits    Narrative:     CALL  [place]  [facility] tel. [phone],  Hematology results called to and read back by CASTILLO PETERSON, 2024  17:42, by MYRON   BASIC METABOLIC PANEL W/ REFLEX TO MG FOR LOW K - Abnormal; Notable for the following components:    Glucose 126 (*)     All other components within normal limits     Critical values: yes     Abnormal Assessment Findings: hgb 6.8    Background  History:   Past Medical History:   Diagnosis Date    Schizophrenia (HCC)     Seasonal allergies        Assessment    Vitals/MEWS:    Level of Consciousness: Alert (0)   Vitals:    24 1926 24   BP: 112/65 110/74 104/65 113/78   Pulse: 90 81 89 80   Resp:  18 16   Temp: 98.8 °F (37.1 °C)      TempSrc: Oral      SpO2: 98% 100% 100% 100%

## 2024-07-22 NOTE — H&P
Internal Medicine  PGY 3  History & Physical      CC: Lightheadedness/fatigue/nausea/palpitations    History Obtained From:  patient    HISTORY OF PRESENT ILLNESS:    Elvira Corbett is a 38 y.o.  with PMH vaginal bleeding s/p admission last year w/outpatient GYN f/u with pending uterine bx that was not conducted, on provera, depression, catatonia who presents with a chief complaint of weakness.  Patient reports that her to symptoms began at her father's house where she began to experience shortness of breath upon exertion while going up stairs.  Patient reports that she has associated palpitations, weakness, lightheadedness, and generalized fatigue.  She has notable history of abnormal uterine bleeding of which she reports that the bleeding has stopped since her last visit in the ED.  She denies any other acute complaints upon complete ROS.  Patient reported having headache initially at presentation and reports that at this time it has resolved.  She reports also having nausea.She reports that she is sexually active with last sexual encounter about 2 weeks ago.  She reports she does not use contraception.  Patient reports that she does not have any abnormal discharge or any concerning findings.  She denies any urinary complaints.    She does have a history of abnormal uterine bleeding which began about 1 year ago.  She has not admission at that time were she did have ovarian cyst elucidated and she was seen by GYN.  Patient reports that prior to this she had regular menstrual cycles that lasted for about 7 days and were heavy.  She reports that since last year she has had vaginal bleeding on most days that is usually light, and makes it hard to differentiate when she is menstruating.    Patient was to follow-up with OB/GYN for uterine biopsy per notes by Dr. Davis.  Patient reports that she was not aware of this.    At presentation to the ED vital signs stable.  Laboratory analysis was conducted revealing

## 2024-07-23 ENCOUNTER — APPOINTMENT (OUTPATIENT)
Dept: GENERAL RADIOLOGY | Age: 39
End: 2024-07-23
Payer: MEDICAID

## 2024-07-23 ENCOUNTER — HOSPITAL ENCOUNTER (EMERGENCY)
Age: 39
Discharge: HOME | End: 2024-07-23
Attending: EMERGENCY MEDICINE
Payer: MEDICAID

## 2024-07-23 VITALS
OXYGEN SATURATION: 100 % | TEMPERATURE: 98.2 F | RESPIRATION RATE: 21 BRPM | SYSTOLIC BLOOD PRESSURE: 99 MMHG | WEIGHT: 176 LBS | DIASTOLIC BLOOD PRESSURE: 82 MMHG | BODY MASS INDEX: 30.05 KG/M2 | HEIGHT: 64 IN | HEART RATE: 69 BPM

## 2024-07-23 DIAGNOSIS — R07.9 CHEST PAIN, UNSPECIFIED TYPE: Primary | ICD-10-CM

## 2024-07-23 LAB
ANION GAP SERPL CALCULATED.3IONS-SCNC: 11 MMOL/L (ref 3–16)
BASE EXCESS BLDV CALC-SCNC: -1.6 MMOL/L (ref -2–3)
BASOPHILS # BLD: 0.1 K/UL (ref 0–0.2)
BASOPHILS NFR BLD: 0.8 %
BUN SERPL-MCNC: 9 MG/DL (ref 7–20)
CALCIUM SERPL-MCNC: 8 MG/DL (ref 8.3–10.6)
CHLORIDE SERPL-SCNC: 105 MMOL/L (ref 99–110)
CO2 BLDV-SCNC: 25 MMOL/L
CO2 SERPL-SCNC: 20 MMOL/L (ref 21–32)
COHGB MFR BLDV: 1.2 % (ref 0–1.5)
CREAT SERPL-MCNC: 0.6 MG/DL (ref 0.6–1.1)
D-DIMER QUANTITATIVE: 0.45 UG/ML FEU (ref 0–0.6)
DEPRECATED RDW RBC AUTO: 24.4 % (ref 12.4–15.4)
DO-HGB MFR BLDV: 0 %
EKG ATRIAL RATE: 68 BPM
EKG ATRIAL RATE: 71 BPM
EKG DIAGNOSIS: NORMAL
EKG DIAGNOSIS: NORMAL
EKG P AXIS: 61 DEGREES
EKG P AXIS: 62 DEGREES
EKG P-R INTERVAL: 150 MS
EKG P-R INTERVAL: 150 MS
EKG Q-T INTERVAL: 376 MS
EKG Q-T INTERVAL: 384 MS
EKG QRS DURATION: 86 MS
EKG QRS DURATION: 98 MS
EKG QTC CALCULATION (BAZETT): 399 MS
EKG QTC CALCULATION (BAZETT): 417 MS
EKG R AXIS: 31 DEGREES
EKG R AXIS: 68 DEGREES
EKG T AXIS: 54 DEGREES
EKG T AXIS: 66 DEGREES
EKG VENTRICULAR RATE: 68 BPM
EKG VENTRICULAR RATE: 71 BPM
EOSINOPHIL # BLD: 0.1 K/UL (ref 0–0.6)
EOSINOPHIL NFR BLD: 1.6 %
GFR SERPLBLD CREATININE-BSD FMLA CKD-EPI: >90 ML/MIN/{1.73_M2}
GLUCOSE SERPL-MCNC: 90 MG/DL (ref 70–99)
HCO3 BLDV-SCNC: 23.3 MMOL/L (ref 24–28)
HCT VFR BLD AUTO: 26 % (ref 36–48)
HGB BLD-MCNC: 8.4 G/DL (ref 12–16)
LYMPHOCYTES # BLD: 1.8 K/UL (ref 1–5.1)
LYMPHOCYTES NFR BLD: 24.2 %
MCH RBC QN AUTO: 24.8 PG (ref 26–34)
MCHC RBC AUTO-ENTMCNC: 32.4 G/DL (ref 31–36)
MCV RBC AUTO: 76.5 FL (ref 80–100)
METHGB MFR BLDV: 0.4 % (ref 0–1.5)
MONOCYTES # BLD: 0.5 K/UL (ref 0–1.3)
MONOCYTES NFR BLD: 6.7 %
NEUTROPHILS # BLD: 5 K/UL (ref 1.7–7.7)
NEUTROPHILS NFR BLD: 66.7 %
NT-PROBNP SERPL-MCNC: 503 PG/ML (ref 0–124)
PCO2 BLDV: 39.1 MMHG (ref 41–51)
PH BLDV: 7.38 [PH] (ref 7.35–7.45)
PLATELET # BLD AUTO: 315 K/UL (ref 135–450)
PMV BLD AUTO: 8.4 FL (ref 5–10.5)
PO2 BLDV: 124 MMHG (ref 25–40)
POTASSIUM SERPL-SCNC: 4 MMOL/L (ref 3.5–5.1)
RBC # BLD AUTO: 3.4 M/UL (ref 4–5.2)
SAO2 % BLDV: 100 %
SODIUM SERPL-SCNC: 136 MMOL/L (ref 136–145)
TROPONIN, HIGH SENSITIVITY: 9 NG/L (ref 0–14)
TROPONIN, HIGH SENSITIVITY: <6 NG/L (ref 0–14)
WBC # BLD AUTO: 7.5 K/UL (ref 4–11)

## 2024-07-23 PROCEDURE — 36415 COLL VENOUS BLD VENIPUNCTURE: CPT

## 2024-07-23 PROCEDURE — 71046 X-RAY EXAM CHEST 2 VIEWS: CPT

## 2024-07-23 PROCEDURE — 99285 EMERGENCY DEPT VISIT HI MDM: CPT

## 2024-07-23 PROCEDURE — 82803 BLOOD GASES ANY COMBINATION: CPT

## 2024-07-23 PROCEDURE — 93005 ELECTROCARDIOGRAM TRACING: CPT | Performed by: EMERGENCY MEDICINE

## 2024-07-23 PROCEDURE — 80048 BASIC METABOLIC PNL TOTAL CA: CPT

## 2024-07-23 PROCEDURE — 85379 FIBRIN DEGRADATION QUANT: CPT

## 2024-07-23 PROCEDURE — 83880 ASSAY OF NATRIURETIC PEPTIDE: CPT

## 2024-07-23 PROCEDURE — 84484 ASSAY OF TROPONIN QUANT: CPT

## 2024-07-23 PROCEDURE — 85025 COMPLETE CBC W/AUTO DIFF WBC: CPT

## 2024-07-23 NOTE — DISCHARGE INSTRUCTIONS
You were seen in the emergency department for chest pain.  Your exam, labs, EKG, and x-ray were all reassuring.  There are no signs of heart attack or blood clot today.  While we do not have the exact cause of your symptoms, there are no findings that would suggest a dangerous or emergent problem at this time.    Call 911 or go to the nearest Emergency Department immediately for worsening symptoms, difficulty breathing, chest pain, lightheadedness, difficulty moving or talking, sensory loss, difficulty controlling your bowel or bladder function, altered level of consciousness, neck stiffness, uncontrollable nausea or vomiting, uncontrollable pain, inability to tolerate oral intake, fever, chills, severe bleeding, signs of infection (spreading redness, area feels very warm, swelling, pain, foul-smelling drainage), thoughts of harming yourself or others, or any other concerns.    If we have prescribed you any new medications, please take them as prescribed and read the drug package insert carefully.  Do not drink alcohol, drive, or operate machinery if you are taking narcotic pain medications.    Your condition requires follow-up with your primary care provider within 7 days, please see above for details. Bring these discharge instructions with you when you see your doctor. Avoid all strenuous activity until you have checked with your primary care provider.

## 2024-07-23 NOTE — ED PROVIDER NOTES
[facility]  EMERGENCY DEPARTMENT ENCOUNTER          ATTENDING PHYSICIAN NOTE       Date of evaluation: 7/23/2024    ADDENDUM:      Care of this patient was assumed from Dr. Moise.  The patient was seen for Chest Pain (Chest pressure) and Anxiety  .  The patient's initial evaluation and plan have been discussed with the prior provider who initially evaluated the patient. Please see the original HPI and MDM for full details. Nursing Notes, Past Medical Hx, Past Surgical Hx, Social Hx, Allergies, and Family Hx were all reviewed.    Diagnostic Results     RADIOLOGY:  XR CHEST (2 VW)   Final Result   No acute cardiopulmonary abnormality.      Electronically signed by Tal Hardin          LABS:   Results for orders placed or performed during the hospital encounter of 07/23/24   CBC with Auto Differential   Result Value Ref Range    WBC 7.5 4.0 - 11.0 K/uL    RBC 3.40 (L) 4.00 - 5.20 M/uL    Hemoglobin 8.4 (L) 12.0 - 16.0 g/dL    Hematocrit 26.0 (L) 36.0 - 48.0 %    MCV 76.5 (L) 80.0 - 100.0 fL    MCH 24.8 (L) 26.0 - 34.0 pg    MCHC 32.4 31.0 - 36.0 g/dL    RDW 24.4 (H) 12.4 - 15.4 %    Platelets 315 135 - 450 K/uL    MPV 8.4 5.0 - 10.5 fL    Neutrophils % 66.7 %    Lymphocytes % 24.2 %    Monocytes % 6.7 %    Eosinophils % 1.6 %    Basophils % 0.8 %    Neutrophils Absolute 5.0 1.7 - 7.7 K/uL    Lymphocytes Absolute 1.8 1.0 - 5.1 K/uL    Monocytes Absolute 0.5 0.0 - 1.3 K/uL    Eosinophils Absolute 0.1 0.0 - 0.6 K/uL    Basophils Absolute 0.1 0.0 - 0.2 K/uL   BMP w/ Reflex to MG   Result Value Ref Range    Sodium 136 136 - 145 mmol/L    Potassium reflex Magnesium 4.0 3.5 - 5.1 mmol/L    Chloride 105 99 - 110 mmol/L    CO2 20 (L) 21 - 32 mmol/L    Anion Gap 11 3 - 16    Glucose 90 70 - 99 mg/dL    BUN 9 7 - 20 mg/dL    Creatinine 0.6 0.6 - 1.1 mg/dL    Est, Glom Filt Rate >90 >60    Calcium 8.0 (L) 8.3 - 10.6 mg/dL   Troponin   Result Value Ref Range    Troponin, High Sensitivity <6 0 - 14 ng/L   Troponin   Result

## 2024-07-23 NOTE — ED PROVIDER NOTES
[facility]  EMERGENCY DEPARTMENT ENCOUNTER          ATTENDING PHYSICIAN NOTE       Date of evaluation: 7/23/2024    Chief Complaint     Chest Pain (Chest pressure) and Anxiety  Chest pain    History of Present Illness     Elvira Corbett is a 38 y.o. female who presents with a chief complaint of chest pain.  States that she went to sleep and then woke up in the middle of the night with pain in the middle of her chest.  Took an aspirin and walked around the house but continued to have discomfort.  Ultimately called 911 to get evaluated.  She was recently started on birth control due to abnormal uterine bleeding and read the warnings on this and noted that it said she could get a blood clot which is why she decided to come back in.  No history of heart problems. On chart review she was recently admitted for abnormal uterine bleeding that required blood products.  On chart review patient also has a history of schizophrenia, bipolar disorder, no prior cardiac workup on file.    ASSESSMENT / PLAN  (MEDICAL DECISION MAKING)     INITIAL VITALS: BP: 112/75, Temp: 98.2 °F (36.8 °C), Pulse: 70, Respirations: 18, SpO2: 96 %      Elvira Corbett is a 38 y.o. female who presents with a chief complaint of chest pain.  Initial exam reveals a well-appearing female in no acute distress with normal vitals, afebrile.  Physical exam remarkable for normal exam including normal heart and lungs.    Patient's presentation is very unlikely to be ACS given her age.  Unlikely to be a PE based on the fact that she just started birth control a few days ago.  Broad workup initiated including EKG, chest x-ray, labs.  Oncoming provider to follow-up on reassessment and final disposition..    Is this patient to be included in the SEP-1 core measure? No Exclusion criteria - the patient is NOT to be included for SEP-1 Core Measure due to: 2+ SIRS criteria are not met    Medical Decision Making  Problems Addressed:  Chest pain, unspecified

## 2024-07-23 NOTE — ED TRIAGE NOTES
Patient presents to ED with complaints of \"chest pressure\", patient reports that this \"pressure\" awoke her from sleep. Patient was seen here last night for the same thing and is concerned that she may have a blood clot and would like a full body scan to make sure she doesn't have blood clots.

## 2024-08-02 ENCOUNTER — OFFICE VISIT (OUTPATIENT)
Dept: GYNECOLOGY | Age: 39
End: 2024-08-02

## 2024-08-02 VITALS
OXYGEN SATURATION: 99 % | WEIGHT: 178.6 LBS | DIASTOLIC BLOOD PRESSURE: 72 MMHG | SYSTOLIC BLOOD PRESSURE: 112 MMHG | HEART RATE: 87 BPM | BODY MASS INDEX: 30.66 KG/M2

## 2024-08-02 DIAGNOSIS — D62 ACUTE ON CHRONIC BLOOD LOSS ANEMIA: ICD-10-CM

## 2024-08-02 DIAGNOSIS — N94.6 DYSMENORRHEA: ICD-10-CM

## 2024-08-02 DIAGNOSIS — N92.0 MENORRHAGIA WITH REGULAR CYCLE: Primary | ICD-10-CM

## 2024-08-02 DIAGNOSIS — D25.1 INTRAMURAL LEIOMYOMA OF UTERUS: ICD-10-CM

## 2024-08-02 PROBLEM — F29 PSYCHOSIS (HCC): Status: ACTIVE | Noted: 2021-04-23

## 2024-08-02 ASSESSMENT — PATIENT HEALTH QUESTIONNAIRE - PHQ9
1. LITTLE INTEREST OR PLEASURE IN DOING THINGS: SEVERAL DAYS
SUM OF ALL RESPONSES TO PHQ9 QUESTIONS 1 & 2: 2
SUM OF ALL RESPONSES TO PHQ QUESTIONS 1-9: 2
2. FEELING DOWN, DEPRESSED OR HOPELESS: SEVERAL DAYS
SUM OF ALL RESPONSES TO PHQ QUESTIONS 1-9: 2

## 2024-08-02 NOTE — PROGRESS NOTES
Elvira Corbett is a 38 y.o. female who is here today for evaluation of abnormal uterine bleeding.    Elvira Corbett has been suffering from menorrhagia.  This is been a chronic problem with exacerbation.   She was recently seen in the hospital with associated anemia and required 2 units of packed red blood cells and iron transfusion.   She is currently on Provera and notes that her bleeding has subsided with it.   She also has dysmenorrhea.    She has an 18-year-old son, vaginal delivery.  She reports that she recently got  about a year ago.  She is undecided regarding future childbearing.    H&P notes from hospitalization:  \"Elvira Corbett is a 38 y.o.  with PMH vaginal bleeding s/p admission last year w/outpatient GYN f/u with pending uterine bx that was not conducted, on provera, depression, catatonia who presents with a chief complaint of weakness.  Patient reports that her to symptoms began at her father's house where she began to experience shortness of breath upon exertion while going up stairs.  Patient reports that she has associated palpitations, weakness, lightheadedness, and generalized fatigue.  She has notable history of abnormal uterine bleeding of which she reports that the bleeding has stopped since her last visit in the ED.  She denies any other acute complaints upon complete ROS.  Patient reported having headache initially at presentation and reports that at this time it has resolved.  She reports also having nausea.She reports that she is sexually active with last sexual encounter about 2 weeks ago.  She reports she does not use contraception.  Patient reports that she does not have any abnormal discharge or any concerning findings.  She denies any urinary complaints.  She does have a history of abnormal uterine bleeding which began about 1 year ago.  She has not admission at that time were she did have ovarian cyst elucidated and she was seen by GYN.  Patient reports that prior

## 2024-08-09 NOTE — TELEPHONE ENCOUNTER
Future Appointments   Date Time Provider Department Center   8/20/2024  8:30 AM Jj Mendoza MD [facility] 8/2/2024       Requested Prescriptions     Pending Prescriptions Disp Refills    medroxyPROGESTERone (PROVERA) 10 MG tablet 30 tablet 0     Sig: Take 1 tablet by mouth daily

## 2024-08-09 NOTE — TELEPHONE ENCOUNTER
Patient is requesting refill on provera until her follow up appointment    [facility] [id number] - [place] [id number] - [place] [phone] - F [phone] [[id number]]

## 2024-08-10 ENCOUNTER — HOSPITAL ENCOUNTER (EMERGENCY)
Age: 39
Discharge: HOME | End: 2024-08-10
Attending: EMERGENCY MEDICINE
Payer: MEDICAID

## 2024-08-10 VITALS
OXYGEN SATURATION: 100 % | WEIGHT: 170.86 LBS | SYSTOLIC BLOOD PRESSURE: 99 MMHG | HEART RATE: 68 BPM | RESPIRATION RATE: 18 BRPM | BODY MASS INDEX: 29.33 KG/M2 | TEMPERATURE: 99.3 F | DIASTOLIC BLOOD PRESSURE: 79 MMHG

## 2024-08-10 DIAGNOSIS — R10.9 ABDOMINAL PAIN, UNSPECIFIED ABDOMINAL LOCATION: Primary | ICD-10-CM

## 2024-08-10 LAB
ALBUMIN SERPL-MCNC: 4.2 G/DL (ref 3.4–5)
ALP SERPL-CCNC: 69 U/L (ref 40–129)
ALT SERPL-CCNC: <5 U/L (ref 10–40)
ANION GAP SERPL CALCULATED.3IONS-SCNC: 12 MMOL/L (ref 3–16)
AST SERPL-CCNC: 17 U/L (ref 15–37)
BASOPHILS # BLD: 0.1 K/UL (ref 0–0.2)
BASOPHILS NFR BLD: 0.8 %
BILIRUB DIRECT SERPL-MCNC: <0.2 MG/DL (ref 0–0.3)
BILIRUB INDIRECT SERPL-MCNC: ABNORMAL MG/DL (ref 0–1)
BILIRUB SERPL-MCNC: 0.3 MG/DL (ref 0–1)
BUN SERPL-MCNC: 9 MG/DL (ref 7–20)
CALCIUM SERPL-MCNC: 9.4 MG/DL (ref 8.3–10.6)
CHLORIDE SERPL-SCNC: 100 MMOL/L (ref 99–110)
CO2 SERPL-SCNC: 22 MMOL/L (ref 21–32)
CREAT SERPL-MCNC: 0.8 MG/DL (ref 0.6–1.1)
DEPRECATED RDW RBC AUTO: 27.2 % (ref 12.4–15.4)
EOSINOPHIL # BLD: 0.1 K/UL (ref 0–0.6)
EOSINOPHIL NFR BLD: 0.7 %
GFR SERPLBLD CREATININE-BSD FMLA CKD-EPI: >90 ML/MIN/{1.73_M2}
GLUCOSE SERPL-MCNC: 89 MG/DL (ref 70–99)
HCG SERPL QL: NEGATIVE
HCT VFR BLD AUTO: 36.3 % (ref 36–48)
HGB BLD-MCNC: 11.6 G/DL (ref 12–16)
LIPASE SERPL-CCNC: 20 U/L (ref 13–60)
LYMPHOCYTES # BLD: 1.6 K/UL (ref 1–5.1)
LYMPHOCYTES NFR BLD: 18.2 %
MCH RBC QN AUTO: 26.4 PG (ref 26–34)
MCHC RBC AUTO-ENTMCNC: 32.1 G/DL (ref 31–36)
MCV RBC AUTO: 82.4 FL (ref 80–100)
MONOCYTES # BLD: 0.6 K/UL (ref 0–1.3)
MONOCYTES NFR BLD: 6.3 %
NEUTROPHILS # BLD: 6.5 K/UL (ref 1.7–7.7)
NEUTROPHILS NFR BLD: 74 %
PLATELET # BLD AUTO: 464 K/UL (ref 135–450)
PMV BLD AUTO: 8.4 FL (ref 5–10.5)
POTASSIUM SERPL-SCNC: 4.3 MMOL/L (ref 3.5–5.1)
PROT SERPL-MCNC: 7.9 G/DL (ref 6.4–8.2)
RBC # BLD AUTO: 4.4 M/UL (ref 4–5.2)
SODIUM SERPL-SCNC: 134 MMOL/L (ref 136–145)
WBC # BLD AUTO: 8.8 K/UL (ref 4–11)

## 2024-08-10 PROCEDURE — 36415 COLL VENOUS BLD VENIPUNCTURE: CPT

## 2024-08-10 PROCEDURE — 2580000003 HC RX 258: Performed by: EMERGENCY MEDICINE

## 2024-08-10 PROCEDURE — 80048 BASIC METABOLIC PNL TOTAL CA: CPT

## 2024-08-10 PROCEDURE — 84703 CHORIONIC GONADOTROPIN ASSAY: CPT

## 2024-08-10 PROCEDURE — 80076 HEPATIC FUNCTION PANEL: CPT

## 2024-08-10 PROCEDURE — 83690 ASSAY OF LIPASE: CPT

## 2024-08-10 PROCEDURE — 96374 THER/PROPH/DIAG INJ IV PUSH: CPT

## 2024-08-10 PROCEDURE — 6360000002 HC RX W HCPCS: Performed by: EMERGENCY MEDICINE

## 2024-08-10 PROCEDURE — 85025 COMPLETE CBC W/AUTO DIFF WBC: CPT

## 2024-08-10 PROCEDURE — 99284 EMERGENCY DEPT VISIT MOD MDM: CPT

## 2024-08-10 RX ORDER — 0.9 % SODIUM CHLORIDE 0.9 %
1000 INTRAVENOUS SOLUTION INTRAVENOUS ONCE
Status: COMPLETED | OUTPATIENT
Start: 2024-08-10 | End: 2024-08-10

## 2024-08-10 RX ORDER — NAPROXEN 500 MG/1
500 TABLET ORAL 2 TIMES DAILY WITH MEALS
Qty: 60 TABLET | Refills: 1 | Status: SHIPPED | OUTPATIENT
Start: 2024-08-10

## 2024-08-10 RX ORDER — KETOROLAC TROMETHAMINE 30 MG/ML
15 INJECTION, SOLUTION INTRAMUSCULAR; INTRAVENOUS ONCE
Status: COMPLETED | OUTPATIENT
Start: 2024-08-10 | End: 2024-08-10

## 2024-08-10 RX ORDER — HYDROMORPHONE HYDROCHLORIDE 1 MG/ML
1 INJECTION, SOLUTION INTRAMUSCULAR; INTRAVENOUS; SUBCUTANEOUS ONCE
Status: DISCONTINUED | OUTPATIENT
Start: 2024-08-10 | End: 2024-08-10

## 2024-08-10 RX ADMIN — SODIUM CHLORIDE 1000 ML: 9 INJECTION, SOLUTION INTRAVENOUS at 01:50

## 2024-08-10 RX ADMIN — KETOROLAC TROMETHAMINE 15 MG: 30 INJECTION INTRAMUSCULAR; INTRAVENOUS at 01:49

## 2024-08-10 ASSESSMENT — PAIN - FUNCTIONAL ASSESSMENT: PAIN_FUNCTIONAL_ASSESSMENT: 0-10

## 2024-08-10 ASSESSMENT — PAIN DESCRIPTION - LOCATION: LOCATION: ABDOMEN;PELVIS

## 2024-08-10 ASSESSMENT — PAIN DESCRIPTION - DESCRIPTORS: DESCRIPTORS: CRAMPING

## 2024-08-10 ASSESSMENT — PAIN SCALES - GENERAL: PAINLEVEL_OUTOF10: 10

## 2024-08-10 ASSESSMENT — PAIN DESCRIPTION - FREQUENCY: FREQUENCY: INTERMITTENT

## 2024-08-10 NOTE — ED PROVIDER NOTES
THE [facility]  EMERGENCY DEPARTMENT ENCOUNTER          ATTENDING PHYSICIAN NOTE       Date of evaluation: 8/10/2024    Chief Complaint     Abdominal Pain and Pelvic Pain (Pt. Presents to ED with c/o lower abd pain/pelvic pain that started mid day 8/9/24. States \"I think my uterus is shrinking.\" )      History of Present Illness     Elvira Corbett is a 38 y.o. female who presents with a chief complaint of abdominal pain, pelvic pain.  Has been having issues with menorrhagia and has seen a gynecologist and is on Provera.  However is still having active vaginal bleeding, no clots, but is having severe lower abdominal pain that is intermittent, \"feels like labor pains \".  Pain is in the lower abdomen with no radiation except to her lower back.  Denies any urinary symptoms.  Admits to some nausea but no vomiting.  No diarrhea.  Has not taken anything at home for the pain.    On chart review, I Note that the patient has been to the ER a few times for similar, has required blood transfusions in the past.  Outpatient workup in 2023 revealed fibroid uterus, has also recently had endometrial biopsy, pending results and follow-up with gynecologist.  She saw a new gynecologist on 8/2 and is following with them for this problem.    Denies any lightheadedness or dizziness today.    ASSESSMENT / PLAN  (MEDICAL DECISION MAKING)     INITIAL VITALS: BP: (!) 119/57, Temp: 99.3 °F (37.4 °C), Pulse: 67, Respirations: 18, SpO2: 97 %      Elvira Corbett is a 38 y.o. female who presents with a chief complaint of abdominal pain, pelvic pain.  Initial exam reveals a well-appearing female in no acute distress with normal vitals, afebrile.  Physical exam remarkable for normal exam including normal heart and lungs.  Mild tenderness to palpation in the lower abdomen..    Patient's presentation is consistent with acute on chronic menorrhagia/dysmenorrhea.  This is well-documented in the patient's chart and she follows with gynecology

## 2024-08-10 NOTE — DISCHARGE INSTRUCTIONS
Your blood count is normal today. Take naproxen as needed for cramping / pain. Follow up with your gynecologist.

## 2024-08-10 NOTE — ED NOTES
Pt discharged to home, alert and oriented. Denies any questions about discharge instructions. Will follow up as directed. encouraged to return for any worsening symptoms.        Mckenna Powers RN  08/10/24 [id number]

## 2024-08-11 ENCOUNTER — HOSPITAL ENCOUNTER (EMERGENCY)
Age: 39
Discharge: HOME | End: 2024-08-11
Payer: MEDICAID

## 2024-08-11 VITALS
HEART RATE: 46 BPM | BODY MASS INDEX: 29.15 KG/M2 | WEIGHT: 169.8 LBS | TEMPERATURE: 99.3 F | RESPIRATION RATE: 16 BRPM | DIASTOLIC BLOOD PRESSURE: 74 MMHG | OXYGEN SATURATION: 98 % | SYSTOLIC BLOOD PRESSURE: 101 MMHG

## 2024-08-11 DIAGNOSIS — Z53.29 CARE REFUSED BY PATIENT: Primary | ICD-10-CM

## 2024-08-11 PROCEDURE — 99282 EMERGENCY DEPT VISIT SF MDM: CPT

## 2024-08-11 ASSESSMENT — PAIN DESCRIPTION - LOCATION: LOCATION: ABDOMEN

## 2024-08-11 ASSESSMENT — LIFESTYLE VARIABLES: HOW OFTEN DO YOU HAVE A DRINK CONTAINING ALCOHOL: NEVER

## 2024-08-11 ASSESSMENT — PAIN DESCRIPTION - ORIENTATION: ORIENTATION: LOWER

## 2024-08-11 ASSESSMENT — PAIN SCALES - GENERAL: PAINLEVEL_OUTOF10: 1

## 2024-08-11 ASSESSMENT — PAIN DESCRIPTION - DESCRIPTORS: DESCRIPTORS: CRAMPING

## 2024-08-11 NOTE — ED NOTES
Patient declining iv placement or labs to be drawn at this time- MD notified     Lorelei Christy RN  08/11/24 [id number]

## 2024-08-11 NOTE — ED PROVIDER NOTES
THE [facility]  EMERGENCY DEPARTMENT ENCOUNTER          ATTENDING PHYSICIAN NOTE       Date of evaluation: 8/11/2024    Chief Complaint     Vaginal Bleeding (PT PRESENTS FOR INCREASED VAGINAL BLEEDING X 1 WEEK. STATES SHE HAS BEEN SEEN SEVERAL TIMES AND HAS BEEN ON MEDICATION TO SLOW HER BLEEDING WITH NO RELIEF.)      History of Present Illness     Elvira Corbett is a 38 y.o. female w/ pmhx of fibroids, schizophrenia who presents for vaginal bleeding. Patient says that she has had heavy bleeding but that she believes the bleeding is getting better today. She says that she no longer wants to be seen and treated in the EC for this condition and does not want to answer any more questions.     ASSESSMENT / PLAN  (MEDICAL DECISION MAKING)     INITIAL VITALS: BP: 97/61, Temp: 99.3 °F (37.4 °C), Pulse: (!) 46, Respirations: 16, SpO2: 99 %      Elvira Corbett is a 38 y.o. female with pmhx of fibroid uterus, schizophrenia who presented to the  initially for vaginal bleeding however patient reports to me that her bleeding is decreasing and she does not want further assessment and treatment in the emergency department. She is well-appearing in the EC, nontoxic, sitting comfortably in bed. She is alert and oriented and her vital signs are stable. She did not want an examination or further testing. I discussed the risks with the patient for further bleeding or abdominal pain in the setting of this symptoms, including worsening bleeding, anemia, possibility of requiring transfusion, or other medical disease to which patient expressed understanding. .    Is this patient to be included in the SEP-1 core measure? No Exclusion criteria - the patient is NOT to be included for SEP-1 Core Measure due to:  unable to fully assess    Medical Decision Making  Amount and/or Complexity of Data Reviewed  Labs: ordered.      Clinical Impression     1. Care refused by patient        Disposition     PATIENT REFERRED TO:  No follow-up

## 2024-08-11 NOTE — DISCHARGE INSTRUCTIONS
I have reviewed discharge instructions with the patient.  The patient verbalized understanding.  Please feel with your primary care physician regarding your stay in the emergency department.  Please return to the ED if you have any severe fatigue, lightheadedness, chest pain, shortness of breath, intractable vomiting, severe vaginal bleeding, or any other concerning symptoms.

## 2024-08-12 RX ORDER — MEDROXYPROGESTERONE ACETATE 10 MG/1
10 TABLET ORAL DAILY
Qty: 30 TABLET | Refills: 0 | Status: SHIPPED | OUTPATIENT
Start: 2024-08-12 | End: 2024-09-11

## 2024-08-13 ENCOUNTER — HOSPITAL ENCOUNTER (EMERGENCY)
Age: 39
Discharge: HOME | End: 2024-08-13
Attending: EMERGENCY MEDICINE
Payer: MEDICAID

## 2024-08-13 VITALS
TEMPERATURE: 98.2 F | HEIGHT: 64 IN | HEART RATE: 96 BPM | RESPIRATION RATE: 18 BRPM | DIASTOLIC BLOOD PRESSURE: 91 MMHG | WEIGHT: 168.9 LBS | SYSTOLIC BLOOD PRESSURE: 123 MMHG | BODY MASS INDEX: 28.83 KG/M2 | OXYGEN SATURATION: 97 %

## 2024-08-13 DIAGNOSIS — N93.8 DYSFUNCTIONAL UTERINE BLEEDING: Primary | ICD-10-CM

## 2024-08-13 LAB
ABO + RH BLD: NORMAL
ANION GAP SERPL CALCULATED.3IONS-SCNC: 11 MMOL/L (ref 3–16)
ANISOCYTOSIS BLD QL SMEAR: ABNORMAL
BASOPHILS # BLD: 0 K/UL (ref 0–0.2)
BASOPHILS NFR BLD: 0 %
BLD GP AB SCN SERPL QL: NORMAL
BUN SERPL-MCNC: 9 MG/DL (ref 7–20)
CALCIUM SERPL-MCNC: 8.8 MG/DL (ref 8.3–10.6)
CHLORIDE SERPL-SCNC: 102 MMOL/L (ref 99–110)
CO2 SERPL-SCNC: 24 MMOL/L (ref 21–32)
CREAT SERPL-MCNC: 0.9 MG/DL (ref 0.6–1.1)
DACRYOCYTES BLD QL SMEAR: ABNORMAL
DEPRECATED RDW RBC AUTO: 25.8 % (ref 12.4–15.4)
EOSINOPHIL # BLD: 0 K/UL (ref 0–0.6)
EOSINOPHIL NFR BLD: 0 %
GFR SERPLBLD CREATININE-BSD FMLA CKD-EPI: 83 ML/MIN/{1.73_M2}
GLUCOSE SERPL-MCNC: 152 MG/DL (ref 70–99)
HCG SERPL QL: NEGATIVE
HCT VFR BLD AUTO: 30 % (ref 36–48)
HGB BLD-MCNC: 9.4 G/DL (ref 12–16)
LYMPHOCYTES # BLD: 1.3 K/UL (ref 1–5.1)
LYMPHOCYTES NFR BLD: 18 %
MCH RBC QN AUTO: 25.6 PG (ref 26–34)
MCHC RBC AUTO-ENTMCNC: 31.2 G/DL (ref 31–36)
MCV RBC AUTO: 82 FL (ref 80–100)
MICROCYTES BLD QL SMEAR: ABNORMAL
MONOCYTES # BLD: 0.4 K/UL (ref 0–1.3)
MONOCYTES NFR BLD: 6 %
NEUTROPHILS # BLD: 5.5 K/UL (ref 1.7–7.7)
NEUTROPHILS NFR BLD: 76 %
OVALOCYTES BLD QL SMEAR: ABNORMAL
PLATELET # BLD AUTO: 392 K/UL (ref 135–450)
PLATELET BLD QL SMEAR: ADEQUATE
PMV BLD AUTO: 8.5 FL (ref 5–10.5)
POTASSIUM SERPL-SCNC: 3.8 MMOL/L (ref 3.5–5.1)
RBC # BLD AUTO: 3.66 M/UL (ref 4–5.2)
SCHISTOCYTES BLD QL SMEAR: ABNORMAL
SLIDE REVIEW: ABNORMAL
SODIUM SERPL-SCNC: 137 MMOL/L (ref 136–145)
WBC # BLD AUTO: 7.2 K/UL (ref 4–11)

## 2024-08-13 PROCEDURE — 80048 BASIC METABOLIC PNL TOTAL CA: CPT

## 2024-08-13 PROCEDURE — 99284 EMERGENCY DEPT VISIT MOD MDM: CPT

## 2024-08-13 PROCEDURE — 86900 BLOOD TYPING SEROLOGIC ABO: CPT

## 2024-08-13 PROCEDURE — 86850 RBC ANTIBODY SCREEN: CPT

## 2024-08-13 PROCEDURE — 84703 CHORIONIC GONADOTROPIN ASSAY: CPT

## 2024-08-13 PROCEDURE — 93005 ELECTROCARDIOGRAM TRACING: CPT | Performed by: EMERGENCY MEDICINE

## 2024-08-13 PROCEDURE — 86901 BLOOD TYPING SEROLOGIC RH(D): CPT

## 2024-08-13 PROCEDURE — 99283 EMERGENCY DEPT VISIT LOW MDM: CPT

## 2024-08-13 PROCEDURE — 85025 COMPLETE CBC W/AUTO DIFF WBC: CPT

## 2024-08-13 ASSESSMENT — PAIN - FUNCTIONAL ASSESSMENT: PAIN_FUNCTIONAL_ASSESSMENT: NONE - DENIES PAIN

## 2024-08-13 NOTE — ED PROVIDER NOTES
Anisocytosis 1+ (A)     Microcytes Occasional (A)     Schistocytes Occasional (A)     Ovalocytes Occasional (A)     Tear Drop Cells Occasional (A)    BMP w/ Reflex to MG   Result Value Ref Range    Sodium 137 136 - 145 mmol/L    Potassium reflex Magnesium 3.8 3.5 - 5.1 mmol/L    Chloride 102 99 - 110 mmol/L    CO2 24 21 - 32 mmol/L    Anion Gap 11 3 - 16    Glucose 152 (H) 70 - 99 mg/dL    BUN 9 7 - 20 mg/dL    Creatinine 0.9 0.6 - 1.1 mg/dL    Est, Glom Filt Rate 83 >60    Calcium 8.8 8.3 - 10.6 mg/dL   HCG Qualitative, Serum   Result Value Ref Range    Preg, Serum Negative Detects HCG level >10 MIU/mL   TYPE AND SCREEN   Result Value Ref Range    ABO/Rh O POS     Antibody Screen NEG          RECENT VITALS:  BP: (!) 123/91,Temp: 98.2 °F (36.8 °C), Pulse: 96, Respirations: 18, SpO2: 97 %     Procedures         ED Course     Nursing Notes, Past Medical Hx, Past Surgical Hx, Social Hx,Allergies, and Family Hx were reviewed.    patient was given the following medications:  No orders of the defined types were placed in this encounter.      CONSULTS:  None    MEDICAL DECISIONMAKING / ASSESSMENT / PLAN     Elvira Corbett is a 38 y.o. female who presents to the emergency room today complaining of vaginal bleeding.  On arrival she is hemodynamically stable mildly tachycardic which resolved without intervention here.  IV access is established screening labs are sent including hemoglobin type and screen for possible need of transfusion.  Patient is actively pursuing workup for dysfunctional uterine bleeding with OB, no further workup for this indicated at the moment, screening labs are largely unremarkable she has stable anemia.  Was advised to be on iron supplementation but she is hesitant to take this.  She was counseled about the importance of following up with her GYN provider.   Patient and family informed about their diagnosis and plan. They were counseled about home care instructions, return precautions, need for

## 2024-08-13 NOTE — ED NOTES
Patient prepared for and ready to be discharged. Dressed in clothes and given belongings.  IV removed, pt tolerated well, no complications.  Patient discharged at this time in no acute distress after pt verbalized understanding of discharge instructions.   Reviewed medications, and when to return to the ED with patient. Encouraged follow up with OB-GYN  Patient walked to lobby, Family to take patient home.      Escuadra, Marylee, RN  08/13/24 [id number]

## 2024-08-14 LAB
EKG ATRIAL RATE: 93 BPM
EKG DIAGNOSIS: NORMAL
EKG P AXIS: 85 DEGREES
EKG P-R INTERVAL: 126 MS
EKG Q-T INTERVAL: 350 MS
EKG QRS DURATION: 86 MS
EKG QTC CALCULATION (BAZETT): 435 MS
EKG R AXIS: 65 DEGREES
EKG T AXIS: 78 DEGREES
EKG VENTRICULAR RATE: 93 BPM

## 2024-08-16 ASSESSMENT — VISUAL ACUITY: OU: 1

## 2025-05-23 ENCOUNTER — HOSPITAL ENCOUNTER (EMERGENCY)
Age: 40
Discharge: HOME | End: 2025-05-23
Attending: EMERGENCY MEDICINE
Payer: MEDICAID

## 2025-05-23 ENCOUNTER — APPOINTMENT (OUTPATIENT)
Dept: GENERAL RADIOLOGY | Age: 40
End: 2025-05-23
Payer: MEDICAID

## 2025-05-23 VITALS
HEART RATE: 62 BPM | RESPIRATION RATE: 19 BRPM | TEMPERATURE: 98.3 F | SYSTOLIC BLOOD PRESSURE: 111 MMHG | DIASTOLIC BLOOD PRESSURE: 75 MMHG | BODY MASS INDEX: 34.38 KG/M2 | OXYGEN SATURATION: 99 % | WEIGHT: 200.3 LBS

## 2025-05-23 DIAGNOSIS — R07.89 ATYPICAL CHEST PAIN: Primary | ICD-10-CM

## 2025-05-23 DIAGNOSIS — M54.50 ACUTE RIGHT-SIDED LOW BACK PAIN WITHOUT SCIATICA: ICD-10-CM

## 2025-05-23 LAB
ANION GAP SERPL CALCULATED.3IONS-SCNC: ABNORMAL MMOL/L (ref 3–16)
BACTERIA URNS QL MICRO: ABNORMAL /HPF
BASOPHILS # BLD: 0 K/UL (ref 0–0.2)
BASOPHILS NFR BLD: 0.7 %
BILIRUB UR QL STRIP.AUTO: NEGATIVE
BUN SERPL-MCNC: 11 MG/DL (ref 7–20)
CALCIUM SERPL-MCNC: 8.7 MG/DL (ref 8.3–10.6)
CHLORIDE SERPL-SCNC: 101 MMOL/L (ref 99–110)
CLARITY UR: CLEAR
CO2 SERPL-SCNC: ABNORMAL MMOL/L (ref 21–32)
COLOR UR: YELLOW
CREAT SERPL-MCNC: 0.7 MG/DL (ref 0.6–1.1)
DEPRECATED RDW RBC AUTO: 14 % (ref 12.4–15.4)
EKG ATRIAL RATE: 67 BPM
EKG DIAGNOSIS: NORMAL
EKG P AXIS: 55 DEGREES
EKG P-R INTERVAL: 158 MS
EKG Q-T INTERVAL: 404 MS
EKG QRS DURATION: 88 MS
EKG QTC CALCULATION (BAZETT): 426 MS
EKG R AXIS: 12 DEGREES
EKG T AXIS: 66 DEGREES
EKG VENTRICULAR RATE: 67 BPM
EOSINOPHIL # BLD: 0.1 K/UL (ref 0–0.6)
EOSINOPHIL NFR BLD: 1.3 %
EPI CELLS #/AREA URNS HPF: ABNORMAL /HPF (ref 0–5)
GFR SERPLBLD CREATININE-BSD FMLA CKD-EPI: >90 ML/MIN/{1.73_M2}
GLUCOSE SERPL-MCNC: 110 MG/DL (ref 70–99)
GLUCOSE UR STRIP.AUTO-MCNC: NEGATIVE MG/DL
HCT VFR BLD AUTO: 36.1 % (ref 36–48)
HGB BLD-MCNC: 12.2 G/DL (ref 12–16)
HGB UR QL STRIP.AUTO: NEGATIVE
KETONES UR STRIP.AUTO-MCNC: NEGATIVE MG/DL
LEUKOCYTE ESTERASE UR QL STRIP.AUTO: ABNORMAL
LYMPHOCYTES # BLD: 1.7 K/UL (ref 1–5.1)
LYMPHOCYTES NFR BLD: 26 %
MCH RBC QN AUTO: 27.5 PG (ref 26–34)
MCHC RBC AUTO-ENTMCNC: 33.8 G/DL (ref 31–36)
MCV RBC AUTO: 81.4 FL (ref 80–100)
MONOCYTES # BLD: 0.4 K/UL (ref 0–1.3)
MONOCYTES NFR BLD: 6.6 %
NEUTROPHILS # BLD: 4.3 K/UL (ref 1.7–7.7)
NEUTROPHILS NFR BLD: 65.4 %
NITRITE UR QL STRIP.AUTO: NEGATIVE
PH UR STRIP.AUTO: 6.5 [PH] (ref 5–8)
PLATELET # BLD AUTO: 405 K/UL (ref 135–450)
PMV BLD AUTO: 8.2 FL (ref 5–10.5)
POTASSIUM SERPL-SCNC: ABNORMAL MMOL/L (ref 3.5–5.1)
PROT UR STRIP.AUTO-MCNC: NEGATIVE MG/DL
RBC # BLD AUTO: 4.43 M/UL (ref 4–5.2)
RBC #/AREA URNS HPF: ABNORMAL /HPF (ref 0–4)
SODIUM SERPL-SCNC: 132 MMOL/L (ref 136–145)
SP GR UR STRIP.AUTO: 1.02 (ref 1–1.03)
TROPONIN, HIGH SENSITIVITY: <6 NG/L (ref 0–14)
TROPONIN, HIGH SENSITIVITY: <6 NG/L (ref 0–14)
UA COMPLETE W REFLEX CULTURE PNL UR: ABNORMAL
UA DIPSTICK W REFLEX MICRO PNL UR: YES
URN SPEC COLLECT METH UR: ABNORMAL
UROBILINOGEN UR STRIP-ACNC: 0.2 E.U./DL
WBC # BLD AUTO: 6.6 K/UL (ref 4–11)
WBC #/AREA URNS HPF: ABNORMAL /HPF (ref 0–5)

## 2025-05-23 PROCEDURE — 6370000000 HC RX 637 (ALT 250 FOR IP)

## 2025-05-23 PROCEDURE — 71046 X-RAY EXAM CHEST 2 VIEWS: CPT

## 2025-05-23 PROCEDURE — 81001 URINALYSIS AUTO W/SCOPE: CPT

## 2025-05-23 PROCEDURE — 84484 ASSAY OF TROPONIN QUANT: CPT

## 2025-05-23 PROCEDURE — 80048 BASIC METABOLIC PNL TOTAL CA: CPT

## 2025-05-23 PROCEDURE — 93005 ELECTROCARDIOGRAM TRACING: CPT

## 2025-05-23 PROCEDURE — 99285 EMERGENCY DEPT VISIT HI MDM: CPT

## 2025-05-23 PROCEDURE — 85025 COMPLETE CBC W/AUTO DIFF WBC: CPT

## 2025-05-23 RX ORDER — METHOCARBAMOL 500 MG/1
750 TABLET, FILM COATED ORAL ONCE
Status: COMPLETED | OUTPATIENT
Start: 2025-05-23 | End: 2025-05-23

## 2025-05-23 RX ORDER — LIDOCAINE 4 G/G
1 PATCH TOPICAL DAILY
Status: DISCONTINUED | OUTPATIENT
Start: 2025-05-23 | End: 2025-05-23 | Stop reason: HOSPADM

## 2025-05-23 RX ORDER — ACETAMINOPHEN 500 MG
1000 TABLET ORAL
Status: COMPLETED | OUTPATIENT
Start: 2025-05-23 | End: 2025-05-23

## 2025-05-23 RX ADMIN — METHOCARBAMOL 750 MG: 500 TABLET ORAL at 18:18

## 2025-05-23 RX ADMIN — ACETAMINOPHEN 1000 MG: 500 TABLET ORAL at 18:18

## 2025-05-23 RX ADMIN — IBUPROFEN 600 MG: 200 TABLET, FILM COATED ORAL at 18:18

## 2025-05-23 ASSESSMENT — PAIN DESCRIPTION - LOCATION
LOCATION: BACK
LOCATION: BACK

## 2025-05-23 ASSESSMENT — LIFESTYLE VARIABLES
HOW OFTEN DO YOU HAVE A DRINK CONTAINING ALCOHOL: MONTHLY OR LESS
HOW MANY STANDARD DRINKS CONTAINING ALCOHOL DO YOU HAVE ON A TYPICAL DAY: 1 OR 2

## 2025-05-23 ASSESSMENT — PAIN SCALES - GENERAL
PAINLEVEL_OUTOF10: 0
PAINLEVEL_OUTOF10: 5

## 2025-05-23 ASSESSMENT — PAIN DESCRIPTION - DESCRIPTORS
DESCRIPTORS: DISCOMFORT
DESCRIPTORS: DISCOMFORT

## 2025-05-23 ASSESSMENT — PAIN DESCRIPTION - ORIENTATION
ORIENTATION: LOWER
ORIENTATION: LOWER

## 2025-05-23 ASSESSMENT — PAIN - FUNCTIONAL ASSESSMENT: PAIN_FUNCTIONAL_ASSESSMENT: ACTIVITIES ARE NOT PREVENTED

## 2025-05-23 NOTE — ED PROVIDER NOTES
ED Attending Attestation Note     Date of evaluation: 5/23/2025    This patient was seen by the advance practice provider.  I have seen and examined the patient, agree with the workup, evaluation, management and diagnosis. The care plan has been discussed.  I have reviewed the ECG and concur with the LINDA's interpretation.  My assessment reveals patient presents with chest pain.  With only comes for second.  On exam breath sounds are bilaterally.  Gait was steady in the emergency department.  Cardiac workup ordered.  Chest x-ray negative.     Zachary Swift MD  05/23/25 [id number]

## 2025-05-23 NOTE — ED TRIAGE NOTES
Pt ambulates into the ER from home with complaint of chest that goes straight into the back. Pain has been intermittently and describes pain as sharp. Pt denies SOB.  Pt denies medication prior to arrival.  Pt is A&OX4. Respirations are even and unlabored. Skin is warm, appropriate for ethnicity, and dry. No acute distress noted.

## 2025-05-23 NOTE — ED PROVIDER NOTES
THE [facility]  EMERGENCY DEPARTMENT ENCOUNTER          PHYSICIAN ASSISTANT NOTE       Date of evaluation: 5/23/2025    Chief Complaint     Chest Pain      History of Present Illness     Elvira Corbett is a 39 y.o. female with past medical history of schizoaffective disorder on [place] presenting with chest pain.  Patient states she has had 2-3 episodes of pain that initiates in the sternal region that radiates to her back.  Patient describes it as electrical pulse.  Patient denies any shortness of breath.  Patient is unsure if anything makes it worse or better.  Patient has not taken anything for pain patient is also endorsing right flank pain with associated urinary frequency.  Patient denies any hematuria, dysuria.    ASSESSMENT / PLAN  (MEDICAL DECISION MAKING)     INITIAL VITALS: BP: (!) 121/92, Temp: 98.3 °F (36.8 °C), Pulse: 74, Respirations: 18, SpO2: 98 %    Elvira Corbett is a 39 y.o. female presenting with chest pain.  On presentation patient is well-appearing and in no acute distress.  Upon presentation patient is hemodynamically stable and afebrile.  Patient's lungs are clear to auscultation bilaterally with no reproducible chest pain.  Given patient presentation workup was initiated which consisted of CBC, BMP, troponins, urinalysis.  Patient was given Tylenol, ibuprofen, Robaxin and a lidocaine patch for symptom management which was beneficial.  Patient had no leukocytosis or anemia.  Patient's UA showed no signs of blood or infection.  Patient's troponins were both less than 6.  Chest x-ray showed no acute cardiopulmonary process.  EKG showed no acute ST elevation.  Given patient's workup and improvement in symptoms is likely musculoskeletal related and patient is safe for discharge at this time.  Patient should continue to take Tylenol and ibuprofen.  Patient should return with worsening symptoms, shortness of breath.    Is this patient to be included in the SEP-1 core measure? No

## 2025-05-23 NOTE — ED NOTES
Patient oriented to room and ED throughput process.  Safety measures with ED bed locked in lowest position and call light in reach.  Patient educated on all orders, including any medications.  Patient educated on chief complaint/symptoms. Patient encouraged to ask questions regarding care, medications or treatment plan.  Patient aware of how to reach staff with questions/concerns.       Radhika Canada, RN  05/23/25 [id number]

## 2025-05-24 NOTE — ED NOTES
Patient prepared for and ready to be discharged. Patient discharged at this time to home in care of self in no acute distress after verbalizing understanding of discharge instructions. Patient left after receiving After Visit Summary instructions. IV removed prior to discharge.       Gavin Guy RN  05/23/25 2040

## 2025-06-24 ENCOUNTER — HOSPITAL ENCOUNTER (EMERGENCY)
Age: 40
Discharge: HOME | End: 2025-06-24
Attending: EMERGENCY MEDICINE
Payer: MEDICAID

## 2025-06-24 VITALS
RESPIRATION RATE: 18 BRPM | SYSTOLIC BLOOD PRESSURE: 114 MMHG | DIASTOLIC BLOOD PRESSURE: 84 MMHG | TEMPERATURE: 97.6 F | HEART RATE: 78 BPM | OXYGEN SATURATION: 99 % | BODY MASS INDEX: 34.38 KG/M2 | HEIGHT: 64 IN

## 2025-06-24 DIAGNOSIS — N93.9 ABNORMAL VAGINAL BLEEDING: Primary | ICD-10-CM

## 2025-06-24 LAB
ABO/RH: NORMAL
ANION GAP SERPL CALCULATED.3IONS-SCNC: 7 MMOL/L (ref 3–16)
ANTIBODY SCREEN: NORMAL
BASOPHILS # BLD: 0 K/UL (ref 0–0.2)
BASOPHILS NFR BLD: 0.4 %
BUN SERPL-MCNC: 10 MG/DL (ref 7–20)
CALCIUM SERPL-MCNC: 8.9 MG/DL (ref 8.3–10.6)
CHLORIDE SERPL-SCNC: 106 MMOL/L (ref 99–110)
CO2 SERPL-SCNC: 24 MMOL/L (ref 21–32)
CREAT SERPL-MCNC: 0.7 MG/DL (ref 0.6–1.1)
DEPRECATED RDW RBC AUTO: 14.9 % (ref 12.4–15.4)
EOSINOPHIL # BLD: 0.1 K/UL (ref 0–0.6)
EOSINOPHIL NFR BLD: 0.9 %
GFR SERPLBLD CREATININE-BSD FMLA CKD-EPI: >90 ML/MIN/{1.73_M2}
GLUCOSE SERPL-MCNC: 105 MG/DL (ref 70–99)
HCT VFR BLD AUTO: 33 % (ref 36–48)
HGB BLD-MCNC: 11 G/DL (ref 12–16)
LYMPHOCYTES # BLD: 1.8 K/UL (ref 1–5.1)
LYMPHOCYTES NFR BLD: 24.7 %
MCH RBC QN AUTO: 27.8 PG (ref 26–34)
MCHC RBC AUTO-ENTMCNC: 33.3 G/DL (ref 31–36)
MCV RBC AUTO: 83.6 FL (ref 80–100)
MONOCYTES # BLD: 0.5 K/UL (ref 0–1.3)
MONOCYTES NFR BLD: 6.5 %
NEUTROPHILS # BLD: 5 K/UL (ref 1.7–7.7)
NEUTROPHILS NFR BLD: 67.5 %
PLATELET # BLD AUTO: 326 K/UL (ref 135–450)
PMV BLD AUTO: 8.3 FL (ref 5–10.5)
POTASSIUM SERPL-SCNC: 3.7 MMOL/L (ref 3.5–5.1)
RBC # BLD AUTO: 3.95 M/UL (ref 4–5.2)
SODIUM SERPL-SCNC: 137 MMOL/L (ref 136–145)
WBC # BLD AUTO: 7.4 K/UL (ref 4–11)

## 2025-06-24 PROCEDURE — 86901 BLOOD TYPING SEROLOGIC RH(D): CPT

## 2025-06-24 PROCEDURE — 80048 BASIC METABOLIC PNL TOTAL CA: CPT

## 2025-06-24 PROCEDURE — 99283 EMERGENCY DEPT VISIT LOW MDM: CPT

## 2025-06-24 PROCEDURE — 85025 COMPLETE CBC W/AUTO DIFF WBC: CPT

## 2025-06-24 PROCEDURE — 86850 RBC ANTIBODY SCREEN: CPT

## 2025-06-24 PROCEDURE — 86900 BLOOD TYPING SEROLOGIC ABO: CPT

## 2025-06-24 RX ORDER — MEDROXYPROGESTERONE ACETATE 10 MG
10 TABLET ORAL DAILY
Qty: 30 TABLET | Refills: 0 | Status: SHIPPED | OUTPATIENT
Start: 2025-06-24 | End: 2025-07-24

## 2025-06-24 RX ORDER — FERROUS SULFATE 325(65) MG
325 TABLET ORAL
Qty: 30 TABLET | Refills: 0 | Status: SHIPPED | OUTPATIENT
Start: 2025-06-24

## 2025-06-24 ASSESSMENT — PAIN DESCRIPTION - DESCRIPTORS: DESCRIPTORS: CRAMPING

## 2025-06-24 ASSESSMENT — ENCOUNTER SYMPTOMS
NAUSEA: 0
SHORTNESS OF BREATH: 0
VOMITING: 0
ABDOMINAL PAIN: 1

## 2025-06-24 ASSESSMENT — PAIN DESCRIPTION - FREQUENCY: FREQUENCY: INTERMITTENT

## 2025-06-24 ASSESSMENT — PAIN DESCRIPTION - PAIN TYPE: TYPE: ACUTE PAIN

## 2025-06-24 ASSESSMENT — PAIN - FUNCTIONAL ASSESSMENT: PAIN_FUNCTIONAL_ASSESSMENT: 0-10

## 2025-06-24 ASSESSMENT — PAIN DESCRIPTION - LOCATION: LOCATION: ABDOMEN

## 2025-06-24 ASSESSMENT — PAIN SCALES - GENERAL: PAINLEVEL_OUTOF10: 3

## 2025-06-25 NOTE — ED TRIAGE NOTES
Pt reports heavy vaginal bleeding with large clots for the past 2 days and painful abd cramping. Pt reports hx of needing blood transfusions last year dt heavy vaginal bleeding.

## 2025-06-25 NOTE — ED PROVIDER NOTES
THE [facility]  EMERGENCY DEPARTMENT ENCOUNTER          ATTENDING PHYSICIAN NOTE       Date of evaluation: 6/24/2025    Chief Complaint     Vaginal Bleeding      History of Present Illness     Elvira Corbett is a 39 y.o. female with a history of uterine fibroids and prior episodes of heavy vaginal bleeding requiring transfusion who presents with complaints of heavy vaginal bleeding since yesterday now associated with fatigue.  She has been using approximately 1-2 pads per hour since yesterday and was concerned that she might be developing anemia.  She has not lightheaded or short of breath yet.  She had some abdominal pain yesterday but that is not present today.  She has been on hormones for treatment before and offered a hysterectomy but she has declined surgery.  She denies chance of pregnancy.    ASSESSMENT / PLAN  (MEDICAL DECISION MAKING)     INITIAL VITALS: BP: 114/84, Temp: 97.6 °F (36.4 °C), Pulse: 78, Respirations: 18, SpO2: 99 %      Elvira Corbett is a 39 y.o. female with a history of uterine fibroids and 2 prior blood transfusions for abnormal uterine bleeding here with heavy vaginal bleeding x 2 days.  She has been diagnosed with fibroids but has declined surgical intervention for fibroids.  Her heavy bleeding is typical of what she has had in the past and medroxyprogesterone has helped.  She has no abdominal pain or tenderness at this time.  CBC shows a normal white count and hemoglobin of 11 which is about at baseline for this patient.  Renal panel is unremarkable.  Type and screen performed but not necessary.  The patient denies any chance of pregnancy at this time and we will started on medroxyprogesterone and iron and asked to follow-up with her gynecologist and his information was provided.    Medical Decision Making  Amount and/or Complexity of Data Reviewed  Labs: ordered.    Risk  OTC drugs.  Prescription drug management.        Clinical Impression     1. Abnormal vaginal bleeding